# Patient Record
Sex: FEMALE | Race: BLACK OR AFRICAN AMERICAN | NOT HISPANIC OR LATINO | Employment: PART TIME | URBAN - METROPOLITAN AREA
[De-identification: names, ages, dates, MRNs, and addresses within clinical notes are randomized per-mention and may not be internally consistent; named-entity substitution may affect disease eponyms.]

---
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
## 2022-04-11 ENCOUNTER — HOSPITAL ENCOUNTER (EMERGENCY)
Facility: HOSPITAL | Age: 22
Discharge: HOME | End: 2022-04-12
Attending: EMERGENCY MEDICINE
Payer: MEDICARE

## 2022-04-11 DIAGNOSIS — K08.89 DENTALGIA: ICD-10-CM

## 2022-04-11 DIAGNOSIS — J45.901 ASTHMA EXACERBATION: Primary | ICD-10-CM

## 2022-04-11 PROCEDURE — 99283 EMERGENCY DEPT VISIT LOW MDM: CPT

## 2022-04-11 PROCEDURE — 94640 AIRWAY INHALATION TREATMENT: CPT

## 2022-04-11 PROCEDURE — 99284 EMERGENCY DEPT VISIT MOD MDM: CPT | Performed by: EMERGENCY MEDICINE

## 2022-04-11 RX ORDER — IPRATROPIUM BROMIDE AND ALBUTEROL SULFATE 2.5; .5 MG/3ML; MG/3ML
3 SOLUTION RESPIRATORY (INHALATION) ONCE
Status: COMPLETED | OUTPATIENT
Start: 2022-04-11 | End: 2022-04-11

## 2022-04-11 RX ORDER — ALBUTEROL SULFATE 90 UG/1
2 AEROSOL, METERED RESPIRATORY (INHALATION) ONCE
Status: COMPLETED | OUTPATIENT
Start: 2022-04-11 | End: 2022-04-11

## 2022-04-11 RX ORDER — IBUPROFEN 600 MG/1
600 TABLET ORAL EVERY 6 HOURS PRN
Qty: 20 TABLET | Refills: 0 | Status: SHIPPED | OUTPATIENT
Start: 2022-04-11

## 2022-04-11 RX ORDER — IBUPROFEN 600 MG/1
600 TABLET ORAL ONCE
Status: COMPLETED | OUTPATIENT
Start: 2022-04-11 | End: 2022-04-11

## 2022-04-11 RX ORDER — PREDNISONE 20 MG/1
60 TABLET ORAL ONCE
Status: COMPLETED | OUTPATIENT
Start: 2022-04-11 | End: 2022-04-11

## 2022-04-11 RX ORDER — ALBUTEROL SULFATE 2.5 MG/3ML
2.5 SOLUTION RESPIRATORY (INHALATION) EVERY 6 HOURS PRN
Qty: 75 ML | Refills: 0 | Status: SHIPPED | OUTPATIENT
Start: 2022-04-11

## 2022-04-11 RX ORDER — PREDNISONE 20 MG/1
40 TABLET ORAL DAILY
Qty: 8 TABLET | Refills: 0 | Status: SHIPPED | OUTPATIENT
Start: 2022-04-11 | End: 2022-04-15

## 2022-04-11 RX ADMIN — IPRATROPIUM BROMIDE AND ALBUTEROL SULFATE 3 ML: 2.5; .5 SOLUTION RESPIRATORY (INHALATION) at 23:24

## 2022-04-11 RX ADMIN — IBUPROFEN 600 MG: 600 TABLET ORAL at 23:22

## 2022-04-11 RX ADMIN — ALBUTEROL SULFATE 2 PUFF: 90 AEROSOL, METERED RESPIRATORY (INHALATION) at 23:22

## 2022-04-11 RX ADMIN — PREDNISONE 60 MG: 20 TABLET ORAL at 23:22

## 2022-04-11 NOTE — Clinical Note
Malloryarron Lemusumm was seen and treated in our emergency department on 4/11/2022  Diagnosis:     Silverio Wei  may return to work on return date  She may return on this date: 04/13/2022         If you have any questions or concerns, please don't hesitate to call        Sera Jackson RN    ______________________________           _______________          _______________  [facility] [place]                              Date                                Time

## 2022-04-12 VITALS
BODY MASS INDEX: 25.03 KG/M2 | DIASTOLIC BLOOD PRESSURE: 68 MMHG | WEIGHT: 159.83 LBS | TEMPERATURE: 98.4 F | OXYGEN SATURATION: 100 % | HEART RATE: 78 BPM | RESPIRATION RATE: 18 BRPM | SYSTOLIC BLOOD PRESSURE: 114 MMHG

## 2022-04-12 NOTE — ED NOTES
Discharge instructions reviewed with patient by provider  Patient verbalized understanding with no further questions at this time  Ambulated with steady gait at time of discharge       Simin Lopez RN  04/12/22 7063

## 2022-04-12 NOTE — ED PROVIDER NOTES
History  Chief Complaint   Patient presents with    Asthma     pt states having an asthma exacerbation with no rescue inhaler  pt tries neb at home with some relief     Dental Pain     pt complains of R lower dental pain  pt currently taking amoxicillin     22 y/o with cough, wheezing, sob on and off since Nov 2021, worse over the past few days  No fevers  Pt  Has a hx of asthma and ran out of her inhaler  She is using her nebulizer with some relief  She also complains of pain in a cracked tooth on her R lower side  It cracked awhile ago but hurts now  She is seeing an endodontist who prescribed her amoxicillin today  No n/v, no cp  Prior to Admission Medications   Prescriptions Last Dose Informant Patient Reported? Taking? albuterol (2 5 mg/3 mL) 0 083 % nebulizer solution   No No   Sig: Take 3 mL (2 5 mg total) by nebulization every 6 (six) hours as needed for wheezing or shortness of breath   albuterol (PROVENTIL HFA,VENTOLIN HFA) 90 mcg/act inhaler   No No   Sig: Inhale 2 puffs every 6 (six) hours as needed for wheezing or shortness of breath   benzonatate (TESSALON PERLES) 100 mg capsule  Self No No   Sig: Take 1 capsule (100 mg total) by mouth 3 (three) times a day   Patient not taking: Reported on 12/10/2021    dextromethorphan-guaifenesin (MUCINEX DM)  MG per 12 hr tablet  Self No No   Sig: Take 1 tablet by mouth every 12 (twelve) hours as needed for cough   fluticasone (FLOVENT HFA) 110 MCG/ACT inhaler  Self Yes No   Sig: Inhale 2 puffs 2 (two) times a day Rinse mouth after use  fluticasone-vilanterol (Breo Ellipta) 200-25 MCG/INH inhaler  Self No No   Sig: Inhale 1 puff daily Rinse mouth after use     Patient not taking: Reported on 12/10/2021    fluticasone-vilanterol (Breo Ellipta) 200-25 MCG/INH inhaler   No No   Sig: Inhale 1 puff daily Rinse mouth after use    montelukast (SINGULAIR) 10 mg tablet  Self Yes No   Sig: Take 10 mg by mouth daily at bedtime   predniSONE 10 mg tablet  Self No No   Sig: Take 40 mg x 3 days, then 30 mg x 3 days, then 20 mg x 3 days, then 10 mg x 3 days   Patient not taking: Reported on 12/10/2021       Facility-Administered Medications: None       Past Medical History:   Diagnosis Date    Anxiety     Asthma        No past surgical history on file  No family history on file  I have reviewed and agree with the history as documented  E-Cigarette/Vaping    E-Cigarette Use Never User      E-Cigarette/Vaping Substances    Nicotine No     THC No     CBD No     Flavoring No     Other No     Unknown No      Social History     Tobacco Use    Smoking status: Never Smoker    Smokeless tobacco: Never Used   Vaping Use    Vaping Use: Never used   Substance Use Topics    Alcohol use: No    Drug use: No       Review of Systems   Constitutional: Negative for appetite change, fatigue and fever  HENT: Positive for dental problem  Negative for rhinorrhea and sore throat  Eyes: Negative for pain  Respiratory: Positive for cough, shortness of breath and wheezing  Cardiovascular: Negative for chest pain and leg swelling  Gastrointestinal: Negative for abdominal pain, diarrhea and vomiting  Genitourinary: Negative for dysuria and flank pain  Musculoskeletal: Negative for back pain and neck pain  Skin: Negative for rash  Neurological: Negative for syncope and headaches  Psychiatric/Behavioral:        Mood normal       Physical Exam  Physical Exam  Vitals and nursing note reviewed  Constitutional:       Appearance: She is well-developed  HENT:      Head: Normocephalic and atraumatic  Right Ear: External ear normal       Left Ear: External ear normal    Eyes:      General: No scleral icterus  Extraocular Movements: Extraocular movements intact  Cardiovascular:      Rate and Rhythm: Normal rate and regular rhythm  Pulmonary:      Effort: Pulmonary effort is normal  No respiratory distress  Comments: +exp   Wheezing diffusely  Abdominal:      Palpations: Abdomen is soft  Tenderness: There is no abdominal tenderness  Musculoskeletal:         General: No deformity or signs of injury  Normal range of motion  Cervical back: Normal range of motion and neck supple  Skin:     General: Skin is warm and dry  Coloration: Skin is not jaundiced or pale  Neurological:      General: No focal deficit present  Mental Status: She is alert and oriented to person, place, and time     Psychiatric:         Mood and Affect: Mood normal          Behavior: Behavior normal          Vital Signs  ED Triage Vitals   Temperature Pulse Respirations Blood Pressure SpO2   04/11/22 2300 04/11/22 2258 04/11/22 2258 04/11/22 2258 04/11/22 2258   98 4 °F (36 9 °C) 95 20 (!) 145/103 98 %      Temp Source Heart Rate Source Patient Position - Orthostatic VS BP Location FiO2 (%)   04/11/22 2300 04/11/22 2258 04/11/22 2258 04/11/22 2258 --   Oral Monitor Sitting Right arm       Pain Score       --                  Vitals:    04/11/22 2258 04/11/22 2315   BP: (!) 145/103 114/68   Pulse: 95 92   Patient Position - Orthostatic VS: Sitting          Visual Acuity      ED Medications  Medications   ipratropium-albuterol (DUO-NEB) 0 5-2 5 mg/3 mL inhalation solution 3 mL (has no administration in time range)   predniSONE tablet 60 mg (has no administration in time range)   ibuprofen (MOTRIN) tablet 600 mg (has no administration in time range)   albuterol (PROVENTIL HFA,VENTOLIN HFA) inhaler 2 puff (2 puffs Inhalation Given 4/11/22 2322)       Diagnostic Studies  Results Reviewed     None                 No orders to display              Procedures  Procedures         ED Course                                             MDM  Number of Diagnoses or Management Options      Disposition  Final diagnoses:   Asthma exacerbation   Dentalgia     Time reflects when diagnosis was documented in both MDM as applicable and the Disposition within this note Time User Action Codes Description Comment    4/11/2022 11:13 PM Bonnie Meng Add [U25 108] Asthma exacerbation     4/11/2022 11:13 PM Hunter Ortega Add [K08 89] Raul Perezs       ED Disposition     ED Disposition Condition Date/Time Comment    Discharge Stable Mon Apr 11, 2022 11:13 PM [address] discharge to home/self care  Follow-up Information     Follow up With Specialties Details Why Contact Info    Sloan Posada DO Family Medicine   [address] [place] [address] [address] [id number] [phone]      Duran Joe, ROSITA Oral Maxillofacial Surgery   [address] [facility] [facility]  [phone]            Patient's Medications   Discharge Prescriptions    ALBUTEROL (2 5 MG/3 ML) 0 083 % NEBULIZER SOLUTION    Take 3 mL (2 5 mg total) by nebulization every 6 (six) hours as needed for wheezing or shortness of breath       Start Date: 4/11/2022 End Date: --       Order Dose: 2 5 mg       Quantity: 75 mL    Refills: 0    IBUPROFEN (MOTRIN) 600 MG TABLET    Take 1 tablet (600 mg total) by mouth every 6 (six) hours as needed for moderate pain       Start Date: 4/11/2022 End Date: --       Order Dose: 600 mg       Quantity: 20 tablet    Refills: 0    PREDNISONE 20 MG TABLET    Take 2 tablets (40 mg total) by mouth daily for 4 days       Start Date: 4/11/2022 End Date: 4/15/2022       Order Dose: 40 mg       Quantity: 8 tablet    Refills: 0       No discharge procedures on file      PDMP Review     None          ED Provider  Electronically Signed by           Jeanna Anderson MD  04/11/22 8944

## 2022-09-08 ENCOUNTER — HOSPITAL ENCOUNTER (EMERGENCY)
Facility: HOSPITAL | Age: 22
Discharge: HOME | End: 2022-09-09
Attending: EMERGENCY MEDICINE

## 2022-09-08 DIAGNOSIS — F41.0 PANIC ATTACK: ICD-10-CM

## 2022-09-08 DIAGNOSIS — F41.9 ANXIETY: Primary | ICD-10-CM

## 2022-09-08 PROCEDURE — 99285 EMERGENCY DEPT VISIT HI MDM: CPT

## 2022-09-08 PROCEDURE — 96372 THER/PROPH/DIAG INJ SC/IM: CPT

## 2022-09-08 PROCEDURE — 99284 EMERGENCY DEPT VISIT MOD MDM: CPT | Performed by: PHYSICIAN ASSISTANT

## 2022-09-08 RX ORDER — LORAZEPAM 2 MG/ML
2 INJECTION INTRAMUSCULAR ONCE
Status: COMPLETED | OUTPATIENT
Start: 2022-09-08 | End: 2022-09-08

## 2022-09-08 RX ORDER — HALOPERIDOL 5 MG/ML
5 INJECTION INTRAMUSCULAR ONCE
Status: COMPLETED | OUTPATIENT
Start: 2022-09-08 | End: 2022-09-08

## 2022-09-08 RX ADMIN — LORAZEPAM 2 MG: 2 INJECTION INTRAMUSCULAR; INTRAVENOUS at 23:07

## 2022-09-08 RX ADMIN — HALOPERIDOL LACTATE 5 MG: 5 INJECTION, SOLUTION INTRAMUSCULAR at 23:04

## 2022-09-08 NOTE — Clinical Note
Perry Diaz was seen and treated in our emergency department on 9/8/2022     ?    ?    ? Diagnosis: ?    Val Redmond  may return to work on return date  She may return on this date: 09/12/2022    ? If you have any questions or concerns, please don't hesitate to call        Barney Galvan PA-C    ______________________________           _______________          _______________  Hospital Representative                              Date                                Time

## 2022-09-08 NOTE — Clinical Note
Brie Pappas accompanied Michelle Sender to the emergency department on 9/8/2022  Return date if applicable: [id number]        If you have any questions or concerns, please don't hesitate to call        Theresa Bhatt PA-C

## 2022-09-08 NOTE — Clinical Note
Sonal Henley was seen and treated in our emergency department on 9/8/2022     ?    ?    ? Diagnosis: ?    Brian Ramirez  may return to work on return date  She may return on this date: 09/12/2022    ? If you have any questions or concerns, please don't hesitate to call        Hans Harvey PA-C    ______________________________           _______________          _______________  Hospital Representative                              Date                                Time

## 2022-09-09 VITALS
SYSTOLIC BLOOD PRESSURE: 118 MMHG | DIASTOLIC BLOOD PRESSURE: 77 MMHG | OXYGEN SATURATION: 99 % | HEART RATE: 70 BPM | RESPIRATION RATE: 20 BRPM | TEMPERATURE: 98.2 F

## 2022-09-09 NOTE — ED NOTES
Pt came back from waiting room unwilling to answer questions  Pt moved to bed and attempting to scratch herself  Per boyfriend and father, pt has a history of self harm by scratching  Provider at bedside to evaluate  Per provider,pt placed in locked restraints for safety at this time        Isela Adrian RN  09/08/22 1319

## 2022-09-09 NOTE — DISCHARGE INSTRUCTIONS
Please return to the ED for any concerns as outlined in the AVS or for any other concerns  Please follow-up with your primary care provider in 2 days for re-evaluation and further management

## 2022-09-09 NOTE — ED PROVIDER NOTES
History  Chief Complaint   Patient presents with    Panic Attack     Panic attack 1 hour reported by boyfriend  Patient is uncooperative with questioning  Elijah Loera is a 22 yo F presenting with "panic attack" and bizarre behavior for the past one hour  Per boyfriend, she was at her job at [employer] when symptoms began  He picked her up and reportedly tried to calm her down but she became more and more agitated, tearful, and anxious  By time of arrival she is scratching all over her body, tearful, and swinging arms and legs sporadically  Per father, she has had numerous similar episodes previously although it has been several years since an episode of this severity  These reportedly began around the age of 17  Father notes he is often able to talk her down and calm her during these episodes before they become this severe  Per father these episodes have resolved with restraints/medications within a few hours  No reported concerns for drug abuse  No reported psychiatric medications at home  History provided by:  Relative and significant other   used: No        Prior to Admission Medications   Prescriptions Last Dose Informant Patient Reported? Taking?    albuterol (2 5 mg/3 mL) 0 083 % nebulizer solution   No No   Sig: Take 3 mL (2 5 mg total) by nebulization every 6 (six) hours as needed for wheezing or shortness of breath   albuterol (2 5 mg/3 mL) 0 083 % nebulizer solution   No No   Sig: Take 3 mL (2 5 mg total) by nebulization every 6 (six) hours as needed for wheezing or shortness of breath   albuterol (PROVENTIL HFA,VENTOLIN HFA) 90 mcg/act inhaler   No No   Sig: Inhale 2 puffs every 6 (six) hours as needed for wheezing or shortness of breath   benzonatate (TESSALON PERLES) 100 mg capsule  Self No No   Sig: Take 1 capsule (100 mg total) by mouth 3 (three) times a day   Patient not taking: Reported on 12/10/2021    dextromethorphan-guaifenesin (MUCINEX DM)  MG per 12 hr tablet  Self No No   Sig: Take 1 tablet by mouth every 12 (twelve) hours as needed for cough   fluticasone (FLOVENT HFA) 110 MCG/ACT inhaler  Self Yes No   Sig: Inhale 2 puffs 2 (two) times a day Rinse mouth after use  fluticasone-vilanterol (Breo Ellipta) 200-25 MCG/INH inhaler  Self No No   Sig: Inhale 1 puff daily Rinse mouth after use  Patient not taking: Reported on 12/10/2021    fluticasone-vilanterol (Breo Ellipta) 200-25 MCG/INH inhaler   No No   Sig: Inhale 1 puff daily Rinse mouth after use  ibuprofen (MOTRIN) 600 mg tablet   No No   Sig: Take 1 tablet (600 mg total) by mouth every 6 (six) hours as needed for moderate pain   montelukast (SINGULAIR) 10 mg tablet  Self Yes No   Sig: Take 10 mg by mouth daily at bedtime   predniSONE 10 mg tablet  Self No No   Sig: Take 40 mg x 3 days, then 30 mg x 3 days, then 20 mg x 3 days, then 10 mg x 3 days   Patient not taking: Reported on 12/10/2021       Facility-Administered Medications: None       Past Medical History:   Diagnosis Date    Anxiety     Asthma        History reviewed  No pertinent surgical history  History reviewed  No pertinent family history  I have reviewed and agree with the history as documented  E-Cigarette/Vaping    E-Cigarette Use Never User      E-Cigarette/Vaping Substances    Nicotine No     THC No     CBD No     Flavoring No     Other No     Unknown No      Social History     Tobacco Use    Smoking status: Never Smoker    Smokeless tobacco: Never Used   Vaping Use    Vaping Use: Never used   Substance Use Topics    Alcohol use: No    Drug use: No       Review of Systems   Unable to perform ROS: Mental status change   Constitutional: Negative for fever  HENT: Negative for congestion and rhinorrhea  Respiratory: Negative for cough, shortness of breath and wheezing  Cardiovascular: Negative for chest pain and palpitations  Gastrointestinal: Negative for vomiting  Skin: Negative for rash and wound  Psychiatric/Behavioral: The patient is nervous/anxious and is hyperactive  Physical Exam  Physical Exam  Constitutional:       Appearance: She is well-developed  Comments: Tearful, crying  Hyperventilating  Sporadically thrashing arms/legs  HENT:      Head: Normocephalic and atraumatic  Right Ear: External ear normal       Left Ear: External ear normal    Eyes:      Conjunctiva/sclera: Conjunctivae normal       Pupils: Pupils are equal, round, and reactive to light  Cardiovascular:      Rate and Rhythm: Regular rhythm  Tachycardia present  Heart sounds: Normal heart sounds  No murmur heard  No friction rub  No gallop  Pulmonary:      Effort: Pulmonary effort is normal  No respiratory distress  Breath sounds: Normal breath sounds  No wheezing  Abdominal:      General: There is no distension  Palpations: Abdomen is soft  Tenderness: There is no guarding  Musculoskeletal:      Cervical back: Normal range of motion and neck supple  Skin:     General: Skin is warm and dry  Capillary Refill: Capillary refill takes less than 2 seconds  Findings: No erythema or rash  Neurological:      Motor: No abnormal muscle tone  Comments: Alerts to verbal stimuli but answering questions inappropriately  Repeats "I'm so tired" and "I'm itchy all over"  Sporadically thrashing extremities, behaving bizarrely  Psychiatric:         Mood and Affect: Mood is anxious  Affect is tearful  Behavior: Behavior is hyperactive           Vital Signs  ED Triage Vitals [09/08/22 2241]   Temperature Pulse Respirations Blood Pressure SpO2   98 2 °F (36 8 °C) (!) 107 20 129/72 97 %      Temp Source Heart Rate Source Patient Position - Orthostatic VS BP Location FiO2 (%)   Temporal Monitor Sitting Left arm --      Pain Score       --           Vitals:    09/08/22 2241   BP: 129/72   Pulse: (!) 107   Patient Position - Orthostatic VS: Sitting         Visual Acuity      ED Medications  Medications   LORazepam (ATIVAN) injection 2 mg (2 mg Intramuscular Given 9/8/22 2307)   haloperidol lactate (HALDOL) injection 5 mg (5 mg Intramuscular Given 9/8/22 2304)       Diagnostic Studies  Results Reviewed     None                 No orders to display              Procedures  Procedures         ED Course  ED Course as of 09/09/22 0054   u Sep 08, 2022   2255 Pt presenting with bizarre behavior, anxiety, scratching all over body  She repeats "everything itches" and "I'm so tired"  Periodically thrashing and swinging arms and legs  Pt appears to pose immediate threat to self/others  Placed into 4 point restraints at this time due to this threat  Will provide haldol/ativan   2350 Some improvement noted by father - she is more responsive to questions at this time  Does still appear to have anxiety, ongoing behavioral abnormality and extremity flailing  Will keep in 4 point restraints until able to tolerate 2 point  Fri Sep 09, 2022   0034 Pt reduced to 2 point restraints at this time, tolerating well  0046 Pt signed out to [employer]  Plan for continued observation, restraint removal as tolerated, disposition pending improvement  MDM      Disposition  Final diagnoses:   None     ED Disposition     None      Follow-up Information    None         Patient's Medications   Discharge Prescriptions    No medications on file       No discharge procedures on file      PDMP Review     None          ED Provider  Electronically Signed by           Nicholas Thomas PA-C  09/09/22 2089

## 2022-09-09 NOTE — ED NOTES
PT was woken up and cooperative  Restraints removed at this time  PT still cooperative        Shelley Lyles  09/09/22 0340

## 2022-09-09 NOTE — ED CARE HANDOFF
Emergency Department Sign Out Note        Sign out and transfer of care from Domingo Romano PA-C  See Separate Emergency Department note  The patient, Constance Acevedo, was evaluated by the previous provider for reported panic attack and bizarre behavior  Workup Completed:   N/A    ED Course / Workup Pending (followup):        Per previous provider patient presents to the ED with her father and boyfriend for concern for panic attack and bizarre behavior  Reports that the patient has had multiple similar episodes previously  Denies any drug use or previous psychiatric history  Patient was put in 4 point restraints for her safety as she was scratching at her skin and given Ativan and Haldol  Now decreased to 2 point restraints  Plan will be to re-evaluate patient in the few hours with likely discharge if patient returns to baseline  If significant signs or symptoms of a panic attack or bizarre persist will consult crisis for re-evaluation  Patient reports she feels safe going home  Family reports they feel ok taking the patient home  Strict return precautions verbally communicated to the patient as outlined in the AVS   All patient questions and concerns were answered  Patient verbally communicated their understanding and agreement to the above plan  Patient stable at discharge  Will be getting a ride home from her father and boyfriend  ED Course as of 09/09/22 0531   Fri Sep 09, 2022   0401 On bedside re-evaluation patient continues to sleep in the stretcher in no acute distress  Patient is still significantly tired likely from the medications however is easily arousable  Will allow patient to continue to sleep and re-evaluate  Patient is currently out of restraints  0524 Pt continues to sleep in the stretcher in NAD  Easily arousable  States she is feeling much better  Denies any physical complaints at this time  Denies SI, HI, Ah and VH  Denies having a PCP   Will provide information for assistance in finding a PCP  Advised to f/u ASAP for re-evaluation and further management  Procedures  MDM        Disposition  Final diagnoses:   Anxiety   Panic attack     Time reflects when diagnosis was documented in both MDM as applicable and the Disposition within this note     Time User Action Codes Description Comment    9/9/2022  4:05 AM Ova Kenn Add [F41 9] Anxiety     9/9/2022  4:05 AM Ova DuPage Add [F41 0] Panic attack       ED Disposition     ED Disposition   Discharge    Condition   Stable    Date/Time   Fri Sep 9, 2022  5:28 AM    Comment   [address] [facility] discharge to home/self care  Follow-up Information     Follow up With Specialties Details Why Contact Info Additional [address] [facility] Emergency Department Emergency Medicine Go to  As needed, If symptoms worsen [facility] [id number]  [address] [facility] Emergency Department, [address] [place] [address] 200  Call  For a primary care provider, For further evaluation [phone]           Patient's Medications   Discharge Prescriptions    No medications on file     No discharge procedures on file         ED Provider  Electronically Signed by     Rudy Roberts PA-C  09/09/22 [address], PA-C  09/09/22 0900

## 2022-10-12 PROBLEM — J20.5 RSV BRONCHITIS: Status: RESOLVED | Noted: 2021-11-19 | Resolved: 2022-10-12

## 2022-12-12 ENCOUNTER — HOSPITAL ENCOUNTER (EMERGENCY)
Facility: HOSPITAL | Age: 22
Discharge: HOME | End: 2022-12-13
Attending: EMERGENCY MEDICINE

## 2022-12-12 ENCOUNTER — APPOINTMENT (EMERGENCY)
Dept: ULTRASOUND IMAGING | Facility: HOSPITAL | Age: 22
End: 2022-12-12

## 2022-12-12 VITALS
TEMPERATURE: 98.1 F | RESPIRATION RATE: 18 BRPM | SYSTOLIC BLOOD PRESSURE: 122 MMHG | WEIGHT: 161.38 LBS | OXYGEN SATURATION: 100 % | HEIGHT: 67 IN | HEART RATE: 71 BPM | BODY MASS INDEX: 25.33 KG/M2 | DIASTOLIC BLOOD PRESSURE: 57 MMHG

## 2022-12-12 DIAGNOSIS — Z34.90 INTRAUTERINE PREGNANCY: Primary | ICD-10-CM

## 2022-12-12 DIAGNOSIS — O26.899 ABDOMINAL PAIN IN PREGNANCY: ICD-10-CM

## 2022-12-12 DIAGNOSIS — R10.9 ABDOMINAL PAIN IN PREGNANCY: ICD-10-CM

## 2022-12-12 DIAGNOSIS — O21.9 NAUSEA AND VOMITING IN PREGNANCY: ICD-10-CM

## 2022-12-12 LAB
B-HCG SERPL-ACNC: ABNORMAL MIU/ML (ref 0–11.6)
EXT PREGNANCY TEST URINE: POSITIVE
EXT. CONTROL: ABNORMAL

## 2022-12-12 RX ORDER — METOCLOPRAMIDE 10 MG/1
10 TABLET ORAL ONCE
Status: COMPLETED | OUTPATIENT
Start: 2022-12-12 | End: 2022-12-12

## 2022-12-12 RX ORDER — PYRIDOXINE HCL (VITAMIN B6) 25 MG
25 TABLET ORAL 3 TIMES DAILY PRN
Qty: 30 TABLET | Refills: 0 | Status: SHIPPED | OUTPATIENT
Start: 2022-12-12

## 2022-12-12 RX ORDER — ACETAMINOPHEN 325 MG/1
975 TABLET ORAL ONCE
Status: COMPLETED | OUTPATIENT
Start: 2022-12-12 | End: 2022-12-12

## 2022-12-12 RX ADMIN — METOCLOPRAMIDE 10 MG: 10 TABLET ORAL at 21:00

## 2022-12-12 RX ADMIN — ACETAMINOPHEN 975 MG: 325 TABLET, FILM COATED ORAL at 21:00

## 2022-12-12 NOTE — Clinical Note
Lebron Wright was seen and treated in our emergency department on 12/12/2022  No restrictions    ? ? Diagnosis: ?    Geovanna Tucker  may return to work on return date  She may return on this date: 12/14/2022    ? If you have any questions or concerns, please don't hesitate to call        Chiquita Reyes PA-C    ______________________________           _______________          _______________  Hospital Representative                              Date                                Time

## 2022-12-13 NOTE — ED PROVIDER NOTES
History  Chief Complaint   Patient presents with   • Abdominal Pain     Generalized abd pain x3 days  +nausea  No medication pta  Had at home positive pregnancy test  Denies vaginal bleeding or discharge     This is a 46-year-old female approximately 6 weeks pregnant presenting for evaluation of generalized abdominal pain x3 days  Abdominal pain is generalized and described as cramping  Patient states that she also has some nausea associated but no vomiting  She did not take any medications for pain prior to arrival   She states that she had a positive pregnancy test in November, LMP was October 30  She denies any vaginal bleeding or discharge at this time  She denies any diarrhea or constipation  History provided by:  Patient   used: No    Abdominal Pain  Pain location:  Generalized  Pain quality: aching and cramping    Pain radiates to:  Does not radiate  Pain severity:  Mild  Onset quality:  Gradual  Duration:  3 days  Timing:  Constant  Progression:  Waxing and waning  Chronicity:  New  Relieved by:  None tried  Worsened by:  Nothing  Ineffective treatments:  None tried  Associated symptoms: nausea    Associated symptoms: no chest pain, no chills, no constipation, no cough, no diarrhea, no dysuria, no fever, no hematuria, no shortness of breath, no sore throat, no vaginal bleeding, no vaginal discharge and no vomiting    Risk factors: pregnancy        Prior to Admission Medications   Prescriptions Last Dose Informant Patient Reported? Taking?    albuterol (2 5 mg/3 mL) 0 083 % nebulizer solution   No No   Sig: Take 3 mL (2 5 mg total) by nebulization every 6 (six) hours as needed for wheezing or shortness of breath   albuterol (2 5 mg/3 mL) 0 083 % nebulizer solution   No No   Sig: Take 3 mL (2 5 mg total) by nebulization every 6 (six) hours as needed for wheezing or shortness of breath   albuterol (PROVENTIL HFA,VENTOLIN HFA) 90 mcg/act inhaler   No No   Sig: Inhale 2 puffs every 6 (six) hours as needed for wheezing or shortness of breath   benzonatate (TESSALON PERLES) 100 mg capsule   No No   Sig: Take 1 capsule (100 mg total) by mouth 3 (three) times a day   Patient not taking: Reported on 12/10/2021    dextromethorphan-guaifenesin (MUCINEX DM)  MG per 12 hr tablet   No No   Sig: Take 1 tablet by mouth every 12 (twelve) hours as needed for cough   fluticasone (FLOVENT HFA) 110 MCG/ACT inhaler   Yes No   Sig: Inhale 2 puffs 2 (two) times a day Rinse mouth after use  fluticasone-vilanterol (Breo Ellipta) 200-25 MCG/INH inhaler   No No   Sig: Inhale 1 puff daily Rinse mouth after use  Patient not taking: Reported on 12/10/2021    fluticasone-vilanterol (Breo Ellipta) 200-25 MCG/INH inhaler   No No   Sig: Inhale 1 puff daily Rinse mouth after use  ibuprofen (MOTRIN) 600 mg tablet   No No   Sig: Take 1 tablet (600 mg total) by mouth every 6 (six) hours as needed for moderate pain   montelukast (SINGULAIR) 10 mg tablet   Yes No   Sig: Take 10 mg by mouth daily at bedtime   predniSONE 10 mg tablet   No No   Sig: Take 40 mg x 3 days, then 30 mg x 3 days, then 20 mg x 3 days, then 10 mg x 3 days   Patient not taking: Reported on 12/10/2021       Facility-Administered Medications: None       Past Medical History:   Diagnosis Date   • Anxiety    • Asthma        History reviewed  No pertinent surgical history  History reviewed  No pertinent family history  I have reviewed and agree with the history as documented  E-Cigarette/Vaping   • E-Cigarette Use Never User      E-Cigarette/Vaping Substances   • Nicotine No    • THC No    • CBD No    • Flavoring No    • Other No    • Unknown No      Social History     Tobacco Use   • Smoking status: Never   • Smokeless tobacco: Never   Vaping Use   • Vaping Use: Never used   Substance Use Topics   • Alcohol use: No   • Drug use: No       Review of Systems   Constitutional: Negative for chills and fever     HENT: Negative for congestion, ear pain, rhinorrhea, sinus pain, sore throat and trouble swallowing  Eyes: Negative for redness and visual disturbance  Respiratory: Negative for cough and shortness of breath  Cardiovascular: Negative for chest pain, palpitations and leg swelling  Gastrointestinal: Positive for abdominal pain and nausea  Negative for constipation, diarrhea and vomiting  Genitourinary: Negative for dysuria, frequency, hematuria, urgency, vaginal bleeding and vaginal discharge  Musculoskeletal: Negative for back pain, myalgias and neck pain  Skin: Negative for color change and rash  Neurological: Negative for dizziness, weakness, light-headedness, numbness and headaches  Psychiatric/Behavioral: The patient is not nervous/anxious  All other systems reviewed and are negative  Physical Exam  Physical Exam  Vitals reviewed  Constitutional:       General: She is not in acute distress  Appearance: Normal appearance  She is well-developed and well-groomed  She is not ill-appearing, toxic-appearing or diaphoretic  HENT:      Head: Normocephalic and atraumatic  Right Ear: External ear normal       Left Ear: External ear normal       Nose: Nose normal  No congestion or rhinorrhea  Mouth/Throat:      Mouth: Mucous membranes are moist       Pharynx: Oropharynx is clear  No oropharyngeal exudate or posterior oropharyngeal erythema  Eyes:      General: No scleral icterus  Right eye: No discharge  Left eye: No discharge  Extraocular Movements: Extraocular movements intact  Conjunctiva/sclera: Conjunctivae normal    Cardiovascular:      Rate and Rhythm: Normal rate and regular rhythm  Pulses: Normal pulses  Heart sounds: No murmur heard  No friction rub  No gallop  Pulmonary:      Effort: Pulmonary effort is normal  No respiratory distress  Breath sounds: Normal breath sounds  No wheezing, rhonchi or rales  Abdominal:      General: Abdomen is flat   There is no distension  Palpations: Abdomen is soft  Tenderness: There is no abdominal tenderness  There is no right CVA tenderness, left CVA tenderness, guarding or rebound  Musculoskeletal:         General: No deformity  Normal range of motion  Cervical back: Normal range of motion and neck supple  Skin:     General: Skin is warm and dry  Coloration: Skin is not jaundiced or pale  Findings: No rash  Neurological:      General: No focal deficit present  Mental Status: She is alert  Psychiatric:         Mood and Affect: Mood normal          Behavior: Behavior normal  Behavior is cooperative           Vital Signs  ED Triage Vitals [12/12/22 1853]   Temperature Pulse Respirations Blood Pressure SpO2   98 1 °F (36 7 °C) 71 18 122/57 100 %      Temp Source Heart Rate Source Patient Position - Orthostatic VS BP Location FiO2 (%)   Oral Monitor Sitting Right arm --      Pain Score       --           Vitals:    12/12/22 1853   BP: 122/57   Pulse: 71   Patient Position - Orthostatic VS: Sitting         Visual Acuity      ED Medications  Medications   metoclopramide (REGLAN) tablet 10 mg (10 mg Oral Given 12/12/22 2100)   acetaminophen (TYLENOL) tablet 975 mg (975 mg Oral Given 12/12/22 2100)       Diagnostic Studies  Results Reviewed     Procedure Component Value Units Date/Time    hCG, quantitative [[id number]]  (Abnormal) Collected: 12/12/22 2127    Lab Status: Final result Specimen: Blood from Arm, Left Updated: 12/12/22 2221     HCG, Quant 50,119 mIU/mL     Narrative:       Expected Ranges:     Approximate               Approximate HCG  Gestation age          Concentration ( mIU/mL)  _____________          ______________________   [facility]                      HCG values  0 2-1                       5-50  1-2                           2-3                         100-5000  3-4                         500-51114  4-5                         1000-42645  5-6 [id number]  6-8                         [id number]  8-12                        [id number]      POCT pregnancy, urine [[id number]]  (Abnormal) Resulted: 12/12/22 2100    Lab Status: Final result Updated: 12/12/22 2100     EXT Preg Test, Ur Positive     Control Valid                 US OB < 14 weeks with transvaginal   Final Result by Sia Valentino MD (12/12 2324)      Single live intrauterine gestation at 6 weeks 1 day  ANGELITA of 12/12/2022  Workstation performed: [id number]                    Procedures  Procedures         ED Course  ED Course as of 12/13/22 0257   Mon Dec 12, 2022   2110 PREGNANCY TEST URINE(!): Positive  LMP 10/30/22  Called OBGYN already  Will US w/ abdominal pain  2224 HCG QUANTITATIVE(!): 50,119   2343 US OB < 14 weeks with transvaginal  Single live intrauterine gestation at 6 weeks 1 day  MDM  Number of Diagnoses or Management Options  Abdominal pain in pregnancy: new and requires workup  Intrauterine pregnancy: new and requires workup  Nausea and vomiting in pregnancy: new and requires workup  Diagnosis management comments:     Patient presenting for evaluation of generalized abdominal pain and nausea in the setting of positive pregnancy test at home  LMP was 10/30/2022, patient already discussed with her OB/GYN  Will order U pregnant and beta quant for evaluation of pregnancy  Will give patient Tylenol and Reglan for symptoms and evaluate for improvement  U pregnant positive, beta quant of 50K, in the setting of generalized abdominal pain and pregnancy, will order transvaginal ultrasound to rule out ectopic pregnancy  Patient feels symptoms improved at this time  Ultrasound with a single live IUP at 6 weeks and 1 day  Reviewed findings with patient  Patient feels improved and comfortable with discharge at this time  Patient encouraged to follow-up with OB/GYN  Patient given B6 and Unisom at time of discharge for nausea    Strict return precautions discussed with patient bedside  Prior to discharge, discharge instructions were discussed with patient at bedside  Patient was provided both verbal and written instructions  Patient is understanding of the discharge instructions and is agreeable to plan of care  Return precautions were discussed with patient bedside, patient verbalized understanding of signs and symptoms that would necessitate return to the ED  All questions were answered  Patient was comfortable with the plan of care and discharged to home  Dispo: discharge home with follow up to OBGYN  Patient stable, in no acute distress and non-toxic at discharge  Amount and/or Complexity of Data Reviewed  Clinical lab tests: ordered and reviewed  Tests in the radiology section of CPT®: ordered and reviewed  Tests in the medicine section of CPT®: ordered and reviewed  Review and summarize past medical records: yes  Independent visualization of images, tracings, or specimens: yes    Risk of Complications, Morbidity, and/or Mortality  Presenting problems: moderate  Management options: low    Patient Progress  Patient progress: improved      Disposition  Final diagnoses:   Intrauterine pregnancy   Abdominal pain in pregnancy   Nausea and vomiting in pregnancy     Time reflects when diagnosis was documented in both MDM as applicable and the Disposition within this note     Time User Action Codes Description Comment    12/12/2022 11:43 PM Dora Quick Add [P24 45] Intrauterine pregnancy     12/12/2022 11:44 PM Dora Quick Add [O26 899,  R10 9] Abdominal pain in pregnancy     12/12/2022 11:46 PM Dora Quick Add [O21 9] Nausea and vomiting in pregnancy       ED Disposition     ED Disposition   Discharge    Condition   Stable    Date/Time   Mon Dec 12, 2022 11:43 PM    Comment   [address] discharge to home/self care                 Follow-up Information     Follow up With Specialties Details [facility]   [address] [place] [address]  [phone]            Discharge Medication List as of 12/12/2022 11:47 PM      START taking these medications    Details   doxylamine (UNISOM) 25 MG tablet Take 0 5 tablets (12 5 mg total) by mouth daily at bedtime as needed for sleep or nausea, Starting Mon 12/12/2022, Normal      pyridoxine (B-6) 25 MG tablet Take 1 tablet (25 mg total) by mouth 3 (three) times a day as needed (nausea), Starting Mon 12/12/2022, Normal         CONTINUE these medications which have NOT CHANGED    Details   !! albuterol (2 5 mg/3 mL) 0 083 % nebulizer solution Take 3 mL (2 5 mg total) by nebulization every 6 (six) hours as needed for wheezing or shortness of breath, Starting Fri 12/10/2021, Normal      !! albuterol (2 5 mg/3 mL) 0 083 % nebulizer solution Take 3 mL (2 5 mg total) by nebulization every 6 (six) hours as needed for wheezing or shortness of breath, Starting Mon 4/11/2022, Normal      albuterol (PROVENTIL HFA,VENTOLIN HFA) 90 mcg/act inhaler Inhale 2 puffs every 6 (six) hours as needed for wheezing or shortness of breath, Starting Fri 12/10/2021, Normal      benzonatate (TESSALON PERLES) 100 mg capsule Take 1 capsule (100 mg total) by mouth 3 (three) times a day, Starting Mon 11/22/2021, Normal      dextromethorphan-guaifenesin (MUCINEX DM)  MG per 12 hr tablet Take 1 tablet by mouth every 12 (twelve) hours as needed for cough, Starting Mon 11/22/2021, Normal      fluticasone (FLOVENT HFA) 110 MCG/ACT inhaler Inhale 2 puffs 2 (two) times a day Rinse mouth after use , Historical Med      fluticasone-vilanterol (Breo Ellipta) 200-25 MCG/INH inhaler Inhale 1 puff daily Rinse mouth after use , Starting Mon 11/22/2021, Until Wed 12/22/2021, Normal      fluticasone-vilanterol (Breo Ellipta) 200-25 MCG/INH inhaler Inhale 1 puff daily Rinse mouth after use , Starting Fri 12/10/2021, Until Sun 1/9/2022, Normal      ibuprofen (MOTRIN) 600 mg tablet Take 1 tablet (600 mg total) by mouth every 6 (six) hours as needed for moderate pain, Starting Mon 4/11/2022, Normal      montelukast (SINGULAIR) 10 mg tablet Take 10 mg by mouth daily at bedtime, Historical Med      predniSONE 10 mg tablet Take 40 mg x 3 days, then 30 mg x 3 days, then 20 mg x 3 days, then 10 mg x 3 days, Normal       !! - Potential duplicate medications found  Please discuss with provider  No discharge procedures on file      PDMP Review     None          ED Provider  Electronically Signed by [facility] Hospital[facility]  12/13/22 8515

## 2022-12-13 NOTE — DISCHARGE INSTRUCTIONS
Please refer to the attached information for strict return instructions  If symptoms worsen or new symptoms develop please return to the ER  Please follow-up with your OBGYN for re-evaluation of symptoms

## 2022-12-15 ENCOUNTER — HOSPITAL ENCOUNTER (EMERGENCY)
Facility: HOSPITAL | Age: 22
Discharge: HOME | End: 2022-12-15
Attending: EMERGENCY MEDICINE

## 2022-12-15 ENCOUNTER — APPOINTMENT (EMERGENCY)
Dept: ULTRASOUND IMAGING | Facility: HOSPITAL | Age: 22
End: 2022-12-15

## 2022-12-15 VITALS
TEMPERATURE: 97.8 F | HEART RATE: 99 BPM | DIASTOLIC BLOOD PRESSURE: 70 MMHG | SYSTOLIC BLOOD PRESSURE: 119 MMHG | BODY MASS INDEX: 24.95 KG/M2 | RESPIRATION RATE: 16 BRPM | OXYGEN SATURATION: 100 % | HEIGHT: 67 IN | WEIGHT: 158.95 LBS

## 2022-12-15 DIAGNOSIS — J06.9 VIRAL URI WITH COUGH: Primary | ICD-10-CM

## 2022-12-15 LAB
FLUAV RNA RESP QL NAA+PROBE: NEGATIVE
FLUBV RNA RESP QL NAA+PROBE: NEGATIVE
RSV RNA RESP QL NAA+PROBE: NEGATIVE
SARS-COV-2 RNA RESP QL NAA+PROBE: NEGATIVE

## 2022-12-15 RX ORDER — ALBUTEROL SULFATE 90 UG/1
2 AEROSOL, METERED RESPIRATORY (INHALATION) ONCE
Status: COMPLETED | OUTPATIENT
Start: 2022-12-15 | End: 2022-12-15

## 2022-12-15 RX ADMIN — ALBUTEROL SULFATE 2 PUFF: 90 AEROSOL, METERED RESPIRATORY (INHALATION) at 19:06

## 2022-12-15 NOTE — ED PROVIDER NOTES
History  Chief Complaint   Patient presents with   • Nasal Congestion     Congestion, cough, diarrhea, headache, generalized bodyaches for the past 2 days  Reports being around sick contacts  Pt 6 weeks pregnant  No abdominal complaints  Patient is a 27-year-old female with a PMH of asthma and anxiety who is  currently 6 weeks pregnant who presents for evaluation of URI symptoms  Patient states she started 2 days ago with symptoms including body aches, headaches, runny nose/congestion, cough  She states that she has had sick contacts with people at work  She also notes that 3 days ago she was seen here in the ED after some abdominal/pelvic pain/cramping  At that time she had a confirmatory ultrasound which showed a single IUP gestational age 7 weeks 1 day with a fetal heart tone of 114 bpm   She states that she scheduled a follow-up appointment with [facility]  She states she has a telephone visit on 2022  She states that she hasn't had any vaginal bleeding, discharge, leakage of fluids  She hasn't had any further abdominal or pelvic pain/cramping  She states she has had some nausea without vomiting  She does have a hx of asthma and takes daily medications  She states she used her albuterol inhaler for asthma symptoms prior to arrival and states this provided relief  She denies fever, chills, chest pain, shortness of breath, ear pain, sore throat, rash  Prior to Admission Medications   Prescriptions Last Dose Informant Patient Reported? Taking?    albuterol (2 5 mg/3 mL) 0 083 % nebulizer solution   No No   Sig: Take 3 mL (2 5 mg total) by nebulization every 6 (six) hours as needed for wheezing or shortness of breath   albuterol (2 5 mg/3 mL) 0 083 % nebulizer solution   No No   Sig: Take 3 mL (2 5 mg total) by nebulization every 6 (six) hours as needed for wheezing or shortness of breath   albuterol (PROVENTIL HFA,VENTOLIN HFA) 90 mcg/act inhaler   No No   Sig: Inhale 2 puffs every 6 (six) hours as needed for wheezing or shortness of breath   benzonatate (TESSALON PERLES) 100 mg capsule   No No   Sig: Take 1 capsule (100 mg total) by mouth 3 (three) times a day   Patient not taking: Reported on 12/10/2021    dextromethorphan-guaifenesin (MUCINEX DM)  MG per 12 hr tablet   No No   Sig: Take 1 tablet by mouth every 12 (twelve) hours as needed for cough   doxylamine (UNISOM) 25 MG tablet   No No   Sig: Take 0 5 tablets (12 5 mg total) by mouth daily at bedtime as needed for sleep or nausea   fluticasone (FLOVENT HFA) 110 MCG/ACT inhaler   Yes No   Sig: Inhale 2 puffs 2 (two) times a day Rinse mouth after use  fluticasone-vilanterol (Breo Ellipta) 200-25 MCG/INH inhaler   No No   Sig: Inhale 1 puff daily Rinse mouth after use  Patient not taking: Reported on 12/10/2021    fluticasone-vilanterol (Breo Ellipta) 200-25 MCG/INH inhaler   No No   Sig: Inhale 1 puff daily Rinse mouth after use  ibuprofen (MOTRIN) 600 mg tablet   No No   Sig: Take 1 tablet (600 mg total) by mouth every 6 (six) hours as needed for moderate pain   montelukast (SINGULAIR) 10 mg tablet   Yes No   Sig: Take 10 mg by mouth daily at bedtime   predniSONE 10 mg tablet   No No   Sig: Take 40 mg x 3 days, then 30 mg x 3 days, then 20 mg x 3 days, then 10 mg x 3 days   Patient not taking: Reported on 12/10/2021    pyridoxine (B-6) 25 MG tablet   No No   Sig: Take 1 tablet (25 mg total) by mouth 3 (three) times a day as needed (nausea)      Facility-Administered Medications: None       Past Medical History:   Diagnosis Date   • Anxiety    • Asthma        History reviewed  No pertinent surgical history  History reviewed  No pertinent family history  I have reviewed and agree with the history as documented      E-Cigarette/Vaping   • E-Cigarette Use Never User      E-Cigarette/Vaping Substances   • Nicotine No    • THC No    • CBD No    • Flavoring No    • Other No    • Unknown No      Social History     Tobacco Use   • Smoking status: Never   • Smokeless tobacco: Never   Vaping Use   • Vaping Use: Never used   Substance Use Topics   • Alcohol use: No   • Drug use: No       Review of Systems   Constitutional: Negative for chills and fever  HENT: Positive for congestion and rhinorrhea  Negative for ear pain and sore throat  Respiratory: Positive for cough  Negative for shortness of breath  Cardiovascular: Negative for chest pain  Gastrointestinal: Positive for nausea  Negative for abdominal pain and vomiting  Genitourinary: Negative for difficulty urinating, pelvic pain, vaginal bleeding, vaginal discharge and vaginal pain  Musculoskeletal: Positive for myalgias  Skin: Negative for rash  Neurological: Positive for headaches  All other systems reviewed and are negative  Physical Exam  Physical Exam  Vitals and nursing note reviewed  Constitutional:       General: She is not in acute distress  Appearance: Normal appearance  She is normal weight  She is not ill-appearing, toxic-appearing or diaphoretic  HENT:      Head: Normocephalic and atraumatic  Right Ear: Tympanic membrane, ear canal and external ear normal       Left Ear: Tympanic membrane, ear canal and external ear normal       Nose: Congestion present  Mouth/Throat:      Mouth: Mucous membranes are moist       Pharynx: Oropharynx is clear  No oropharyngeal exudate or posterior oropharyngeal erythema  Eyes:      Extraocular Movements: Extraocular movements intact  Conjunctiva/sclera: Conjunctivae normal    Cardiovascular:      Rate and Rhythm: Normal rate and regular rhythm  Heart sounds: Normal heart sounds  No murmur heard  Pulmonary:      Effort: Pulmonary effort is normal  No respiratory distress  Breath sounds: Normal breath sounds  No stridor  No wheezing, rhonchi or rales  Abdominal:      General: Abdomen is flat  Bowel sounds are normal  There is no distension  Palpations: Abdomen is soft  Tenderness: There is no abdominal tenderness  There is no guarding  Musculoskeletal:         General: Normal range of motion  Cervical back: Normal range of motion  Skin:     General: Skin is warm and dry  Neurological:      Mental Status: She is alert  Psychiatric:         Mood and Affect: Mood normal          Vital Signs  ED Triage Vitals   Temperature Pulse Respirations Blood Pressure SpO2   12/15/22 1537 12/15/22 1537 12/15/22 1537 12/15/22 1538 12/15/22 1537   97 8 °F (36 6 °C) 99 16 119/70 100 %      Temp Source Heart Rate Source Patient Position - Orthostatic VS BP Location FiO2 (%)   12/15/22 1537 12/15/22 1537 12/15/22 1537 12/15/22 1537 --   Oral Monitor Sitting Right arm       Pain Score       --                  Vitals:    12/15/22 1537 12/15/22 1538   BP:  119/70   Pulse: 99    Patient Position - Orthostatic VS: Sitting Sitting         Visual Acuity      ED Medications  Medications   albuterol (PROVENTIL HFA,VENTOLIN HFA) inhaler 2 puff (2 puffs Inhalation Given 12/15/22 1906)       Diagnostic Studies  Results Reviewed     Procedure Component Value Units Date/Time    FLU/RSV/COVID - if FLU/RSV clinically relevant [[id number]]  (Normal) Collected: 12/15/22 1622    Lab Status: Final result Specimen: Nares from Nasopharyngeal Swab Updated: 12/15/22 1736     SARS-CoV-2 Negative     INFLUENZA A PCR Negative     INFLUENZA B PCR Negative     RSV PCR Negative    Narrative:      FOR PEDIATRIC PATIENTS - copy/paste COVID Guidelines URL to browser: [email or web address]    SARS-CoV-2 assay is a Nucleic Acid Amplification assay intended for the  qualitative detection of nucleic acid from SARS-CoV-2 in nasopharyngeal  swabs  Results are for the presumptive identification of SARS-CoV-2 RNA      Positive results are indicative of infection with SARS-CoV-2, the virus  causing COVID-19, but do not rule out bacterial infection or co-infection  with other viruses  Laboratories within the [place] and its  territories are required to report all positive results to the appropriate  public health authorities  Negative results do not preclude SARS-CoV-2  infection and should not be used as the sole basis for treatment or other  patient management decisions  Negative results must be combined with  clinical observations, patient history, and epidemiological information  This test has not been FDA cleared or approved  This test has been authorized by FDA under an Emergency Use Authorization  (EUA)  This test is only authorized for the duration of time the  declaration that circumstances exist justifying the authorization of the  emergency use of an in vitro diagnostic tests for detection of SARS-CoV-2  virus and/or diagnosis of COVID-19 infection under section 564(b)(1) of  the Act, 21 U  S C  849ASW-5(J)(7), unless the authorization is terminated  or revoked sooner  The test has been validated but independent review by FDA  and CLIA is pending  Test performed using [employer] GeneXpert: This RT-PCR assay targets N2,  a region unique to SARS-CoV-2  A conserved region in the E-gene was chosen  for pan-Sarbecovirus detection which includes SARS-CoV-2  According to CMS-2020-01-R, this platform meets the definition of high-throughput technology  US OB < 14 weeks with transvaginal   Final Result by Shaun Orourke MD (12/15 1855)      Single live intrauterine gestation at 7 weeks 0 days (range +/- 4)  ANGELITA of 8/3/2023  Workstation performed: [id number]                    Procedures  Procedures         ED Course                                             MDM  Number of Diagnoses or Management Options  Viral URI with cough  Diagnosis management comments: Will send covid/flu/rsv testing  Patient recently had confirmatory US done on 12/12/22- Single live intrauterine gestation at 6 weeks 1 day      Attempted bedside TAUS to check fetal tones- unable to obtain after multiple attempts by providers in ED  TT sent to OBGYN resident on call Dr Gunner Damico - If unable to get tones with a TAUS (which is hard at 6wk) then patient needs a formal scan  Discussed with US tech- informed need repeat scan to check FHT per OB request      Formal OB US- Single live intrauterine gestation at 7 weeks 0 days (range +/- 4)  ANGELITA of 8/3/2023    Covid/flu/RSV negative  Discussed results with OBGYN resident- patient able to be discharged home and follow up outpatient with her OBGYN  Discussed all results with patient  Discussed viral syndrome and supportive care  Patient asks for albuterol inhaler as she doesn't have one  Will dispense from ED  Instructed to follow up with her OBGYN office in 1 day and discuss ED visit  Instructed on strict return precautions if symptoms worsen or new symptoms arise  Patient states understanding and agrees with plan  Amount and/or Complexity of Data Reviewed  Clinical lab tests: ordered  Tests in the radiology section of CPT®: ordered and reviewed    Patient Progress  Patient progress: stable      Disposition  Final diagnoses:   Viral URI with cough     Time reflects when diagnosis was documented in both MDM as applicable and the Disposition within this note     Time User Action Codes Description Comment    12/15/2022  6:59 PM Shaun Kenney Add [J06 9] Viral URI with cough       ED Disposition     ED Disposition   Discharge    Condition   Stable    Date/Time   Thu Dec 15, 2022  6:58 PM    Comment   [facility]  Follow-up Information     Follow up With Specialties Details Why Contact Info Additional Information    Antonietta Wynn,  Family Medicine Schedule an appointment as soon as possible for a visit in 1 day  [facility]  [phone]       Follow up with your OBGYN in 1-2 days   Discuss ED visit and review results from your visit           [facility] Obstetrics and Gynecology Schedule an appointment as soon as possible for a visit  As needed if you can not see your OBGYN [address]  [address] [phone]  [address], [address], [address] [facility], [place], [address] [place] [address], [address] [facility] Emergency [facility]  If symptoms worsen [facility] [phone]  [address] [place] [address] Emergency Department, [address] [facility] [address] , [facility], [address] [facility], [address]          Discharge Medication List as of 12/15/2022  7:38 PM      CONTINUE these medications which have NOT CHANGED    Details   !! albuterol (2 5 mg/3 mL) 0 083 % nebulizer solution Take 3 mL (2 5 mg total) by nebulization every 6 (six) hours as needed for wheezing or shortness of breath, Starting Fri 12/10/2021, Normal      !! albuterol (2 5 mg/3 mL) 0 083 % nebulizer solution Take 3 mL (2 5 mg total) by nebulization every 6 (six) hours as needed for wheezing or shortness of breath, Starting Mon 4/11/2022, Normal      albuterol (PROVENTIL HFA,VENTOLIN HFA) 90 mcg/act inhaler Inhale 2 puffs every 6 (six) hours as needed for wheezing or shortness of breath, Starting Fri 12/10/2021, Normal      benzonatate (TESSALON PERLES) 100 mg capsule Take 1 capsule (100 mg total) by mouth 3 (three) times a day, Starting Mon 11/22/2021, Normal      dextromethorphan-guaifenesin (MUCINEX DM)  MG per 12 hr tablet Take 1 tablet by mouth every 12 (twelve) hours as needed for cough, Starting Mon 11/22/2021, Normal      doxylamine (UNISOM) 25 MG tablet Take 0 5 tablets (12 5 mg total) by mouth daily at bedtime as needed for sleep or nausea, Starting Mon 12/12/2022, Normal      fluticasone (FLOVENT HFA) 110 MCG/ACT inhaler Inhale 2 puffs 2 (two) times a day Rinse mouth after use , Historical Med fluticasone-vilanterol (Breo Ellipta) 200-25 MCG/INH inhaler Inhale 1 puff daily Rinse mouth after use , Starting Mon 11/22/2021, Until Wed 12/22/2021, Normal      fluticasone-vilanterol (Breo Ellipta) 200-25 MCG/INH inhaler Inhale 1 puff daily Rinse mouth after use , Starting Fri 12/10/2021, Until Sun 1/9/2022, Normal      ibuprofen (MOTRIN) 600 mg tablet Take 1 tablet (600 mg total) by mouth every 6 (six) hours as needed for moderate pain, Starting Mon 4/11/2022, Normal      montelukast (SINGULAIR) 10 mg tablet Take 10 mg by mouth daily at bedtime, Historical Med      predniSONE 10 mg tablet Take 40 mg x 3 days, then 30 mg x 3 days, then 20 mg x 3 days, then 10 mg x 3 days, Normal      pyridoxine (B-6) 25 MG tablet Take 1 tablet (25 mg total) by mouth 3 (three) times a day as needed (nausea), Starting Mon 12/12/2022, Normal       !! - Potential duplicate medications found  Please discuss with provider  No discharge procedures on file      PDMP Review     None          ED Provider  Electronically Signed by           Carmelita Pop PA-C  12/15/22 2059

## 2022-12-21 ENCOUNTER — HOSPITAL ENCOUNTER (EMERGENCY)
Facility: HOSPITAL | Age: 22
Discharge: HOME | End: 2022-12-21
Attending: EMERGENCY MEDICINE

## 2022-12-21 VITALS
TEMPERATURE: 98.1 F | OXYGEN SATURATION: 100 % | RESPIRATION RATE: 18 BRPM | SYSTOLIC BLOOD PRESSURE: 110 MMHG | HEART RATE: 80 BPM | DIASTOLIC BLOOD PRESSURE: 64 MMHG

## 2022-12-21 DIAGNOSIS — U07.1 COVID-19: Primary | ICD-10-CM

## 2022-12-21 DIAGNOSIS — R11.2 NAUSEA AND VOMITING: ICD-10-CM

## 2022-12-21 LAB
ALBUMIN SERPL BCP-MCNC: 3.9 G/DL (ref 3.5–5)
ALP SERPL-CCNC: 57 U/L (ref 46–116)
ALT SERPL W P-5'-P-CCNC: 40 U/L (ref 12–78)
ANION GAP SERPL CALCULATED.3IONS-SCNC: 9 MMOL/L (ref 4–13)
AST SERPL W P-5'-P-CCNC: 23 U/L (ref 5–45)
BASOPHILS # BLD AUTO: 0.05 THOUSANDS/ÂΜL (ref 0–0.1)
BASOPHILS NFR BLD AUTO: 0 % (ref 0–1)
BILIRUB SERPL-MCNC: 0.21 MG/DL (ref 0.2–1)
BILIRUB UR QL STRIP: NEGATIVE
BUN SERPL-MCNC: 9 MG/DL (ref 5–25)
CALCIUM SERPL-MCNC: 9.2 MG/DL (ref 8.3–10.1)
CHLORIDE SERPL-SCNC: 101 MMOL/L (ref 96–108)
CLARITY UR: NORMAL
CO2 SERPL-SCNC: 26 MMOL/L (ref 21–32)
COLOR UR: YELLOW
CREAT SERPL-MCNC: 0.78 MG/DL (ref 0.6–1.3)
EOSINOPHIL # BLD AUTO: 1.1 THOUSAND/ÂΜL (ref 0–0.61)
EOSINOPHIL NFR BLD AUTO: 10 % (ref 0–6)
ERYTHROCYTE [DISTWIDTH] IN BLOOD BY AUTOMATED COUNT: 11.8 % (ref 11.6–15.1)
FLUAV RNA RESP QL NAA+PROBE: NEGATIVE
FLUBV RNA RESP QL NAA+PROBE: NEGATIVE
GFR SERPL CREATININE-BSD FRML MDRD: 109 ML/MIN/1.73SQ M
GLUCOSE SERPL-MCNC: 79 MG/DL (ref 65–140)
GLUCOSE UR STRIP-MCNC: NEGATIVE MG/DL
HCT VFR BLD AUTO: 34.1 % (ref 34.8–46.1)
HGB BLD-MCNC: 11.3 G/DL (ref 11.5–15.4)
HGB UR QL STRIP.AUTO: NEGATIVE
IMM GRANULOCYTES # BLD AUTO: 0.04 THOUSAND/UL (ref 0–0.2)
IMM GRANULOCYTES NFR BLD AUTO: 0 % (ref 0–2)
KETONES UR STRIP-MCNC: NEGATIVE MG/DL
LEUKOCYTE ESTERASE UR QL STRIP: NEGATIVE
LYMPHOCYTES # BLD AUTO: 1.63 THOUSANDS/ÂΜL (ref 0.6–4.47)
LYMPHOCYTES NFR BLD AUTO: 15 % (ref 14–44)
MCH RBC QN AUTO: 31.4 PG (ref 26.8–34.3)
MCHC RBC AUTO-ENTMCNC: 33.1 G/DL (ref 31.4–37.4)
MCV RBC AUTO: 95 FL (ref 82–98)
MONOCYTES # BLD AUTO: 1.45 THOUSAND/ÂΜL (ref 0.17–1.22)
MONOCYTES NFR BLD AUTO: 13 % (ref 4–12)
NEUTROPHILS # BLD AUTO: 6.98 THOUSANDS/ÂΜL (ref 1.85–7.62)
NEUTS SEG NFR BLD AUTO: 62 % (ref 43–75)
NITRITE UR QL STRIP: NEGATIVE
NRBC BLD AUTO-RTO: 0 /100 WBCS
PH UR STRIP.AUTO: 6 [PH]
PLATELET # BLD AUTO: 253 THOUSANDS/UL (ref 149–390)
PMV BLD AUTO: 9.8 FL (ref 8.9–12.7)
POTASSIUM SERPL-SCNC: 4.3 MMOL/L (ref 3.5–5.3)
PROT SERPL-MCNC: 7.8 G/DL (ref 6.4–8.4)
PROT UR STRIP-MCNC: NEGATIVE MG/DL
RBC # BLD AUTO: 3.6 MILLION/UL (ref 3.81–5.12)
RSV RNA RESP QL NAA+PROBE: NEGATIVE
SARS-COV-2 RNA RESP QL NAA+PROBE: POSITIVE
SODIUM SERPL-SCNC: 136 MMOL/L (ref 135–147)
SP GR UR STRIP.AUTO: >=1.03 (ref 1–1.03)
UROBILINOGEN UR QL STRIP.AUTO: 1 E.U./DL
WBC # BLD AUTO: 11.25 THOUSAND/UL (ref 4.31–10.16)

## 2022-12-21 RX ORDER — DOXYLAMINE SUCCINATE AND PYRIDOXINE HYDROCHLORIDE, DELAYED RELEASE TABLETS 10 MG/10 MG 10; 10 MG/1; MG/1
1 TABLET, DELAYED RELEASE ORAL
Qty: 7 TABLET | Refills: 0 | Status: SHIPPED | OUTPATIENT
Start: 2022-12-21

## 2022-12-21 RX ORDER — PROMETHAZINE HYDROCHLORIDE 25 MG/ML
12.5 INJECTION, SOLUTION INTRAMUSCULAR; INTRAVENOUS ONCE
Status: COMPLETED | OUTPATIENT
Start: 2022-12-21 | End: 2022-12-21

## 2022-12-21 RX ORDER — ALBUTEROL SULFATE 90 UG/1
2 AEROSOL, METERED RESPIRATORY (INHALATION) EVERY 4 HOURS PRN
Qty: 6.7 G | Refills: 0 | Status: SHIPPED | OUTPATIENT
Start: 2022-12-21 | End: 2023-12-21

## 2022-12-21 RX ORDER — ALBUTEROL SULFATE 90 UG/1
2 AEROSOL, METERED RESPIRATORY (INHALATION) ONCE
Status: COMPLETED | OUTPATIENT
Start: 2022-12-21 | End: 2022-12-21

## 2022-12-21 RX ORDER — GUAIFENESIN 100 MG/5ML
200 SYRUP ORAL EVERY 4 HOURS PRN
Qty: 60 ML | Refills: 0 | Status: SHIPPED | OUTPATIENT
Start: 2022-12-21

## 2022-12-21 RX ORDER — ONDANSETRON 2 MG/ML
4 INJECTION INTRAMUSCULAR; INTRAVENOUS ONCE
Status: COMPLETED | OUTPATIENT
Start: 2022-12-21 | End: 2022-12-21

## 2022-12-21 RX ORDER — ACETAMINOPHEN 325 MG/1
650 TABLET ORAL ONCE
Status: COMPLETED | OUTPATIENT
Start: 2022-12-21 | End: 2022-12-21

## 2022-12-21 RX ADMIN — ONDANSETRON 4 MG: 2 INJECTION INTRAMUSCULAR; INTRAVENOUS at 08:14

## 2022-12-21 RX ADMIN — ALBUTEROL SULFATE 2 PUFF: 90 AEROSOL, METERED RESPIRATORY (INHALATION) at 08:54

## 2022-12-21 RX ADMIN — SODIUM CHLORIDE 1000 ML: 0.9 INJECTION, SOLUTION INTRAVENOUS at 08:14

## 2022-12-21 RX ADMIN — ACETAMINOPHEN 650 MG: 325 TABLET, FILM COATED ORAL at 08:15

## 2022-12-21 RX ADMIN — PROMETHAZINE HYDROCHLORIDE 12.5 MG: 25 INJECTION INTRAMUSCULAR; INTRAVENOUS at 08:55

## 2022-12-21 NOTE — Clinical Note
Odalis Brown accompanied Greyson Wren to the emergency department on 12/21/2022  Return date if applicable: If you have any questions or concerns, please don't hesitate to call        Evelyn Landrum

## 2022-12-21 NOTE — ED PROVIDER NOTES
History  Chief Complaint   Patient presents with   • Cough     Pt reports cough for one week  61-year-old female P0 approximately 7 weeks pregnant  Was seen here 1 week ago was diagnosed with viral syndrome negative flu negative COVID-negative RSV  Now she has a worsening dry hacking cough body aches headache subjective fever no shortness of breath or chest pain  She had been vomiting 2 days ago and has little p o  intake she attributes that to being pregnant  Denies any abdominal pain no vaginal discharge no vaginal gush no vaginal bleeding  No back pain  She is not tachycardic nor tachypneic nor hypoxic  Differential diagnosis includes not limited to COVID, influenza, RSV, pneumonia less likely          Prior to Admission Medications   Prescriptions Last Dose Informant Patient Reported? Taking?   benzonatate (TESSALON PERLES) 100 mg capsule   No No   Sig: Take 1 capsule (100 mg total) by mouth 3 (three) times a day   Patient not taking: Reported on 12/10/2021    fluticasone (FLOVENT HFA) 110 MCG/ACT inhaler   Yes No   Sig: Inhale 2 puffs 2 (two) times a day Rinse mouth after use  fluticasone-vilanterol (Breo Ellipta) 200-25 MCG/INH inhaler   No No   Sig: Inhale 1 puff daily Rinse mouth after use  Patient not taking: Reported on 12/10/2021    fluticasone-vilanterol (Breo Ellipta) 200-25 MCG/INH inhaler   No No   Sig: Inhale 1 puff daily Rinse mouth after use     ibuprofen (MOTRIN) 600 mg tablet   No No   Sig: Take 1 tablet (600 mg total) by mouth every 6 (six) hours as needed for moderate pain   montelukast (SINGULAIR) 10 mg tablet   Yes No   Sig: Take 10 mg by mouth daily at bedtime   predniSONE 10 mg tablet   No No   Sig: Take 40 mg x 3 days, then 30 mg x 3 days, then 20 mg x 3 days, then 10 mg x 3 days   Patient not taking: Reported on 12/10/2021    pyridoxine (B-6) 25 MG tablet   No No   Sig: Take 1 tablet (25 mg total) by mouth 3 (three) times a day as needed (nausea)      Facility-Administered Medications: None       Past Medical History:   Diagnosis Date   • Anxiety    • Asthma        History reviewed  No pertinent surgical history  History reviewed  No pertinent family history  I have reviewed and agree with the history as documented  E-Cigarette/Vaping   • E-Cigarette Use Never User      E-Cigarette/Vaping Substances   • Nicotine No    • THC No    • CBD No    • Flavoring No    • Other No    • Unknown No      Social History     Tobacco Use   • Smoking status: Never   • Smokeless tobacco: Never   Vaping Use   • Vaping Use: Never used   Substance Use Topics   • Alcohol use: No   • Drug use: No       Review of Systems   Constitutional: Positive for fever  Negative for chills, diaphoresis and fatigue  HENT: Negative for congestion, ear pain, nosebleeds and sore throat  Eyes: Negative for photophobia, pain, discharge and visual disturbance  Respiratory: Positive for choking  Negative for cough, chest tightness, shortness of breath and wheezing  Cardiovascular: Negative for chest pain and palpitations  Gastrointestinal: Negative for abdominal distention, abdominal pain, diarrhea and vomiting  Genitourinary: Negative  Negative for dysuria, flank pain, frequency and hematuria  Musculoskeletal: Positive for myalgias  Negative for arthralgias, back pain, gait problem and joint swelling  Skin: Negative for color change and rash  Neurological: Negative for dizziness, seizures, syncope and headaches  Psychiatric/Behavioral: Negative for behavioral problems and confusion  The patient is not nervous/anxious  All other systems reviewed and are negative  Physical Exam  Physical Exam  Vitals and nursing note reviewed  Constitutional:       General: She is not in acute distress  Appearance: Normal appearance  She is not ill-appearing, toxic-appearing or diaphoretic  HENT:      Head: Normocephalic and atraumatic        Right Ear: Tympanic membrane, ear canal and external ear normal       Left Ear: Tympanic membrane, ear canal and external ear normal       Nose: Nose normal  No congestion or rhinorrhea  Mouth/Throat:      Mouth: Mucous membranes are moist       Pharynx: Oropharynx is clear  No oropharyngeal exudate or posterior oropharyngeal erythema  Eyes:      Extraocular Movements: Extraocular movements intact  Conjunctiva/sclera: Conjunctivae normal       Pupils: Pupils are equal, round, and reactive to light  Cardiovascular:      Rate and Rhythm: Normal rate and regular rhythm  Pulmonary:      Effort: Pulmonary effort is normal  No respiratory distress  Breath sounds: Normal breath sounds  Abdominal:      General: Bowel sounds are normal       Palpations: Abdomen is soft  Tenderness: There is no abdominal tenderness  Musculoskeletal:         General: Normal range of motion  Cervical back: Normal range of motion and neck supple  No rigidity or tenderness  Right lower leg: No edema  Left lower leg: No edema  Lymphadenopathy:      Cervical: No cervical adenopathy  Skin:     General: Skin is warm and dry  Capillary Refill: Capillary refill takes less than 2 seconds  Findings: No rash  Neurological:      General: No focal deficit present  Mental Status: She is alert and oriented to person, place, and time  Mental status is at baseline     Psychiatric:         Mood and Affect: Mood normal          Behavior: Behavior normal          Vital Signs  ED Triage Vitals   Temperature Pulse Respirations Blood Pressure SpO2   12/21/22 0619 12/21/22 0619 12/21/22 0619 12/21/22 0619 12/21/22 0619   98 1 °F (36 7 °C) 96 22 110/82 99 %      Temp Source Heart Rate Source Patient Position - Orthostatic VS BP Location FiO2 (%)   12/21/22 0619 12/21/22 0619 12/21/22 0859 12/21/22 0619 --   Temporal Monitor Lying Right arm       Pain Score       12/21/22 0814       8           Vitals:    12/21/22 0619 12/21/22 0859   BP: 110/82 110/57   Pulse: 96 88   Patient Position - Orthostatic VS:  Lying         Visual Acuity      ED Medications  Medications   sodium chloride 0 9 % bolus 1,000 mL (1,000 mL Intravenous New Bag 12/21/22 0814)   ondansetron (ZOFRAN) injection 4 mg (4 mg Intravenous Given 12/21/22 0814)   acetaminophen (TYLENOL) tablet 650 mg (650 mg Oral Given 12/21/22 0815)   promethazine (PHENERGAN) injection 12 5 mg (12 5 mg Intravenous Given 12/21/22 0855)   albuterol (PROVENTIL HFA,VENTOLIN HFA) inhaler 2 puff (2 puffs Inhalation Given 12/21/22 0854)       Diagnostic Studies  Results Reviewed     Procedure Component Value Units Date/Time    CBC and differential [[id number]]  (Abnormal) Collected: 12/21/22 0907    Lab Status: Final result Specimen: Blood from Arm, Right Updated: 12/21/22 0918     WBC 11 25 Thousand/uL      RBC 3 60 Million/uL      Hemoglobin 11 3 g/dL      Hematocrit 34 1 %      MCV 95 fL      MCH 31 4 pg      MCHC 33 1 g/dL      RDW 11 8 %      MPV 9 8 fL      Platelets 900 Thousands/uL      nRBC 0 /100 WBCs      Neutrophils Relative 62 %      Immat GRANS % 0 %      Lymphocytes Relative 15 %      Monocytes Relative 13 %      Eosinophils Relative 10 %      Basophils Relative 0 %      Neutrophils Absolute 6 98 Thousands/µL      Immature Grans Absolute 0 04 Thousand/uL      Lymphocytes Absolute 1 63 Thousands/µL      Monocytes Absolute 1 45 Thousand/µL      Eosinophils Absolute 1 10 Thousand/µL      Basophils Absolute 0 05 Thousands/µL     Comprehensive metabolic panel [[id number]] Collected: 12/21/22 0803    Lab Status: Final result Specimen: Blood from Arm, Right Updated: 12/21/22 0915     Sodium 136 mmol/L      Potassium 4 3 mmol/L      Chloride 101 mmol/L      CO2 26 mmol/L      ANION GAP 9 mmol/L      BUN 9 mg/dL      Creatinine 0 78 mg/dL      Glucose 79 mg/dL      Calcium 9 2 mg/dL      AST 23 U/L      ALT 40 U/L      Alkaline Phosphatase 57 U/L      Total Protein 7 8 g/dL      Albumin 3 9 g/dL      Total Bilirubin 0 21 mg/dL eGFR 109 ml/min/1 73sq m     Narrative:      [facility] guidelines for Chronic Kidney Disease (CKD):   •  Stage 1 with normal or high GFR (GFR > 90 mL/min/1 73 square meters)  •  Stage 2 Mild CKD (GFR = 60-89 mL/min/1 73 square meters)  •  Stage 3A Moderate CKD (GFR = 45-59 mL/min/1 73 square meters)  •  Stage 3B Moderate CKD (GFR = 30-44 mL/min/1 73 square meters)  •  Stage 4 Severe CKD (GFR = 15-29 mL/min/1 73 square meters)  •  Stage 5 End Stage CKD (GFR <15 mL/min/1 73 square meters)  Note: GFR calculation is accurate only with a steady state creatinine    FLU/RSV/COVID - if FLU/RSV clinically relevant [[id number]]  (Abnormal) Collected: 12/21/22 0756    Lab Status: Final result Specimen: Nares from Nose Updated: 12/21/22 0900     SARS-CoV-2 Positive     INFLUENZA A PCR Negative     INFLUENZA B PCR Negative     RSV PCR Negative    Narrative:      FOR PEDIATRIC PATIENTS - copy/paste COVID Guidelines URL to browser: [email or web address]  [employer]    SARS-CoV-2 assay is a Nucleic Acid Amplification assay intended for the  qualitative detection of nucleic acid from SARS-CoV-2 in nasopharyngeal  swabs  Results are for the presumptive identification of SARS-CoV-2 RNA  Positive results are indicative of infection with SARS-CoV-2, the virus  causing COVID-19, but do not rule out bacterial infection or co-infection  with other viruses  Laboratories within the [place] and its  territories are required to report all positive results to the appropriate  public health authorities  Negative results do not preclude SARS-CoV-2  infection and should not be used as the sole basis for treatment or other  patient management decisions  Negative results must be combined with  clinical observations, patient history, and epidemiological information  This test has not been FDA cleared or approved      This test has been authorized by FDA under an Emergency Use Authorization  (EUA)  This test is only authorized for the duration of time the  declaration that circumstances exist justifying the authorization of the  emergency use of an in vitro diagnostic tests for detection of SARS-CoV-2  virus and/or diagnosis of COVID-19 infection under section 564(b)(1) of  the Act, 21 U  S C  296ABV-2(W)(4), unless the authorization is terminated  or revoked sooner  The test has been validated but independent review by FDA  and CLIA is pending  Test performed using [employer] GeneXpert: This RT-PCR assay targets N2,  a region unique to SARS-CoV-2  A conserved region in the E-gene was chosen  for pan-Sarbecovirus detection which includes SARS-CoV-2  According to CMS-2020-01-R, this platform meets the definition of high-throughput technology  UA w Reflex to Microscopic w Reflex to Culture [[id number]] Collected: 12/21/22 0755    Lab Status: Final result Specimen: Urine, Clean Catch Updated: 12/21/22 0836     Color, UA Yellow     Clarity, UA Cloudy     Specific Gravity, UA >=1 030     pH, UA 6 0     Leukocytes, UA Negative     Nitrite, UA Negative     Protein, UA Negative mg/dl      Glucose, UA Negative mg/dl      Ketones, UA Negative mg/dl      Urobilinogen, UA 1 0 E U /dl      Bilirubin, UA Negative     Occult Blood, UA Negative     URINE COMMENT --    Urine culture [[id number]] Collected: 12/21/22 0755    Lab Status: In process Specimen: Urine, Clean Catch Updated: 12/21/22 0836                 No orders to display              Procedures  Procedures         ED Course                                             MDM  Number of Diagnoses or Management Options  COVID-19: new and requires workup  Nausea and vomiting: new and requires workup  Diagnosis management comments: 61-year-old female patient who is approximately 7 weeks pregnant without abdominal pain no abdominal cramping no vaginal gush no vaginal discharge no vaginal bleeding no back pain or signs of labor    Presents with a cough nonfocal headache body aches subjective fever  Positive for COVID had an albuterol 2 puffs and promethazine cough has ceased there is been no shortness of breath or chest pain  Also had some vomiting throughout the week and some nausea was given a bag of fluid Zofran and feels better  She is stable for discharge all labs reviewed       Amount and/or Complexity of Data Reviewed  Clinical lab tests: ordered and reviewed  Tests in the radiology section of CPT®: ordered and reviewed  Tests in the medicine section of CPT®: ordered and reviewed  Decide to obtain previous medical records or to obtain history from someone other than the patient: yes  Review and summarize past medical records: yes  Independent visualization of images, tracings, or specimens: yes    Risk of Complications, Morbidity, and/or Mortality  Presenting problems: moderate  Diagnostic procedures: moderate    Patient Progress  Patient progress: stable      Disposition  Final diagnoses:   COVID-19   Nausea and vomiting     Time reflects when diagnosis was documented in both MDM as applicable and the Disposition within this note     Time User Action Codes Description Comment    12/21/2022  9:35 AM Checo Martinez Add [U07 1] COVID-19     12/21/2022  9:36 AM Checo Martinez Add [R11 2] Nausea and vomiting       ED Disposition     ED Disposition   Discharge    Condition   Stable    Date/Time   Wed Dec 21, 2022  9:35 AM    Comment   Yoko Caldwell discharge to home/self care                 Follow-up Information     Follow up With Specialties Details Why Contact Info    Roly Adrienne,  Family Medicine Schedule an appointment as soon as possible for a visit   [address]  [facility] [address]  [phone]            Patient's Medications   Discharge Prescriptions    ALBUTEROL (PROVENTIL HFA,VENTOLIN HFA) 90 MCG/ACT INHALER    Inhale 2 puffs every 4 (four) hours as needed for wheezing       Start Date: 12/21/2022End Date: 12/21/2023       Order Dose: 2 puffs       Quantity: 6 7 g    Refills: 0    DOXYLAMINE-PYRIDOXINE (DICLEGIS) 10-10 MG TBEC    Take 1 tablet by mouth daily at bedtime as needed (nausea)       Start Date: 12/21/2022End Date: --       Order Dose: 1 tablet       Quantity: 7 tablet    Refills: 0    GUAIFENESIN (ROBITUSSIN) 100 MG/5ML ORAL LIQUID    Take 10 mL (200 mg total) by mouth every 4 (four) hours as needed for cough       Start Date: 12/21/2022End Date: --       Order Dose: 200 mg       Quantity: 60 mL    Refills: 0       No discharge procedures on file      PDMP Review     None          ED Provider  Electronically Signed by           Raissa Hale PA-C  12/21/22 0021

## 2022-12-21 NOTE — Clinical Note
Ofelia Dahiana was seen and treated in our emergency department on 12/21/2022  No restrictions            Diagnosis:     Nate Dent  may return to work on return date  She may return on this date: 12/22/2022         If you have any questions or concerns, please don't hesitate to call        Allan Chino    ______________________________           _______________          _______________  [facility] Representative                              Date                                Time

## 2022-12-21 NOTE — Clinical Note
Jose Thomas was seen and treated in our emergency department on 12/21/2022  No restrictions            Diagnosis:     He Martin  may return to work on return date  She may return on this date: 12/22/2022         If you have any questions or concerns, please don't hesitate to call        Viktor Lehman    ______________________________           _______________          _______________  [facility] Representative                              Date                                Time

## 2022-12-22 LAB
BACTERIA UR CULT: ABNORMAL
BACTERIA UR CULT: ABNORMAL

## 2023-01-18 ENCOUNTER — HOSPITAL ENCOUNTER (EMERGENCY)
Facility: HOSPITAL | Age: 23
Discharge: HOME | End: 2023-01-18
Attending: EMERGENCY MEDICINE

## 2023-01-18 VITALS
BODY MASS INDEX: 25.14 KG/M2 | RESPIRATION RATE: 18 BRPM | SYSTOLIC BLOOD PRESSURE: 118 MMHG | TEMPERATURE: 98.1 F | OXYGEN SATURATION: 100 % | DIASTOLIC BLOOD PRESSURE: 58 MMHG | WEIGHT: 160.5 LBS | HEART RATE: 98 BPM

## 2023-01-18 DIAGNOSIS — U07.1 COVID-19: ICD-10-CM

## 2023-01-18 DIAGNOSIS — J45.901 EXACERBATION OF ASTHMA, UNSPECIFIED ASTHMA SEVERITY, UNSPECIFIED WHETHER PERSISTENT: Primary | ICD-10-CM

## 2023-01-18 LAB
ANION GAP SERPL CALCULATED.3IONS-SCNC: 7 MMOL/L (ref 4–13)
BASE EX.OXY STD BLDV CALC-SCNC: 72.7 % (ref 60–80)
BASE EXCESS BLDV CALC-SCNC: -1.6 MMOL/L
BASOPHILS # BLD AUTO: 0.06 THOUSANDS/ÂΜL (ref 0–0.1)
BASOPHILS NFR BLD AUTO: 0 % (ref 0–1)
BUN SERPL-MCNC: 11 MG/DL (ref 5–25)
CALCIUM SERPL-MCNC: 9.1 MG/DL (ref 8.4–10.2)
CHLORIDE SERPL-SCNC: 105 MMOL/L (ref 96–108)
CO2 SERPL-SCNC: 24 MMOL/L (ref 21–32)
CREAT SERPL-MCNC: 0.79 MG/DL (ref 0.6–1.3)
EOSINOPHIL # BLD AUTO: 0.73 THOUSAND/ÂΜL (ref 0–0.61)
EOSINOPHIL NFR BLD AUTO: 5 % (ref 0–6)
ERYTHROCYTE [DISTWIDTH] IN BLOOD BY AUTOMATED COUNT: 12.1 % (ref 11.6–15.1)
FLUAV RNA RESP QL NAA+PROBE: NEGATIVE
FLUBV RNA RESP QL NAA+PROBE: NEGATIVE
GFR SERPL CREATININE-BSD FRML MDRD: 108 ML/MIN/1.73SQ M
GLUCOSE SERPL-MCNC: 88 MG/DL (ref 65–140)
HCO3 BLDV-SCNC: 24.1 MMOL/L (ref 24–30)
HCT VFR BLD AUTO: 36.3 % (ref 34.8–46.1)
HGB BLD-MCNC: 12.3 G/DL (ref 11.5–15.4)
IMM GRANULOCYTES # BLD AUTO: 0.06 THOUSAND/UL (ref 0–0.2)
IMM GRANULOCYTES NFR BLD AUTO: 0 % (ref 0–2)
LYMPHOCYTES # BLD AUTO: 2.81 THOUSANDS/ÂΜL (ref 0.6–4.47)
LYMPHOCYTES NFR BLD AUTO: 18 % (ref 14–44)
MCH RBC QN AUTO: 31.9 PG (ref 26.8–34.3)
MCHC RBC AUTO-ENTMCNC: 33.9 G/DL (ref 31.4–37.4)
MCV RBC AUTO: 94 FL (ref 82–98)
MONOCYTES # BLD AUTO: 1.05 THOUSAND/ÂΜL (ref 0.17–1.22)
MONOCYTES NFR BLD AUTO: 7 % (ref 4–12)
NEUTROPHILS # BLD AUTO: 11.09 THOUSANDS/ÂΜL (ref 1.85–7.62)
NEUTS SEG NFR BLD AUTO: 70 % (ref 43–75)
NRBC BLD AUTO-RTO: 0 /100 WBCS
O2 CT BLDV-SCNC: 13.8 ML/DL
PCO2 BLDV: 44.1 MM HG (ref 42–50)
PH BLDV: 7.36 [PH] (ref 7.3–7.4)
PLATELET # BLD AUTO: 312 THOUSANDS/UL (ref 149–390)
PMV BLD AUTO: 9.5 FL (ref 8.9–12.7)
PO2 BLDV: 40.6 MM HG (ref 35–45)
POTASSIUM SERPL-SCNC: 4 MMOL/L (ref 3.5–5.3)
RBC # BLD AUTO: 3.86 MILLION/UL (ref 3.81–5.12)
RSV RNA RESP QL NAA+PROBE: NEGATIVE
SARS-COV-2 RNA RESP QL NAA+PROBE: NEGATIVE
SODIUM SERPL-SCNC: 136 MMOL/L (ref 135–147)
WBC # BLD AUTO: 15.8 THOUSAND/UL (ref 4.31–10.16)

## 2023-01-18 RX ORDER — ALBUTEROL SULFATE 90 UG/1
2 AEROSOL, METERED RESPIRATORY (INHALATION) ONCE
Status: COMPLETED | OUTPATIENT
Start: 2023-01-18 | End: 2023-01-18

## 2023-01-18 RX ORDER — ALBUTEROL SULFATE 2.5 MG/3ML
2.5 SOLUTION RESPIRATORY (INHALATION) EVERY 6 HOURS PRN
Qty: 75 ML | Refills: 0 | Status: SHIPPED | OUTPATIENT
Start: 2023-01-18 | End: 2023-04-18

## 2023-01-18 RX ORDER — ALBUTEROL SULFATE 90 UG/1
2 AEROSOL, METERED RESPIRATORY (INHALATION) EVERY 4 HOURS PRN
Qty: 18 G | Refills: 0 | Status: SHIPPED | OUTPATIENT
Start: 2023-01-18 | End: 2024-01-18

## 2023-01-18 RX ORDER — SODIUM CHLORIDE FOR INHALATION 0.9 %
12 VIAL, NEBULIZER (ML) INHALATION ONCE
Status: COMPLETED | OUTPATIENT
Start: 2023-01-18 | End: 2023-01-18

## 2023-01-18 RX ORDER — ACETAMINOPHEN 325 MG/1
975 TABLET ORAL ONCE
Status: COMPLETED | OUTPATIENT
Start: 2023-01-18 | End: 2023-01-18

## 2023-01-18 RX ORDER — METHYLPREDNISOLONE SODIUM SUCCINATE 125 MG/2ML
125 INJECTION, POWDER, LYOPHILIZED, FOR SOLUTION INTRAMUSCULAR; INTRAVENOUS ONCE
Status: COMPLETED | OUTPATIENT
Start: 2023-01-18 | End: 2023-01-18

## 2023-01-18 RX ORDER — PREDNISONE 20 MG/1
60 TABLET ORAL DAILY
Qty: 12 TABLET | Refills: 0 | Status: SHIPPED | OUTPATIENT
Start: 2023-01-18 | End: 2023-01-22

## 2023-01-18 RX ADMIN — IPRATROPIUM BROMIDE 1 MG: 0.5 SOLUTION RESPIRATORY (INHALATION) at 20:51

## 2023-01-18 RX ADMIN — ISODIUM CHLORIDE 12 ML: 0.03 SOLUTION RESPIRATORY (INHALATION) at 20:51

## 2023-01-18 RX ADMIN — ALBUTEROL SULFATE 10 MG: 2.5 SOLUTION RESPIRATORY (INHALATION) at 20:50

## 2023-01-18 RX ADMIN — METHYLPREDNISOLONE SODIUM SUCCINATE 125 MG: 125 INJECTION, POWDER, FOR SOLUTION INTRAMUSCULAR; INTRAVENOUS at 20:59

## 2023-01-18 RX ADMIN — ALBUTEROL SULFATE 2 PUFF: 90 AEROSOL, METERED RESPIRATORY (INHALATION) at 22:10

## 2023-01-18 RX ADMIN — ACETAMINOPHEN 975 MG: 325 TABLET ORAL at 21:46

## 2023-01-18 NOTE — Clinical Note
Falcon Shelby was seen and treated in our emergency department on 1/18/2023  Diagnosis: Asthma exacerbation    Yoko  may return to work on return date  She may return on this date: 01/20/2023         If you have any questions or concerns, please don't hesitate to call        Oliver Garner PA-C    ______________________________           _______________          _______________  [facility] Representative                              Date                                Time

## 2023-01-18 NOTE — Clinical Note
Ofelia Dahiana was seen and treated in our emergency department on 1/18/2023  Diagnosis: Asthma exacerbation    Yoko  may return to work on return date  She may return on this date: 01/20/2023         If you have any questions or concerns, please don't hesitate to call        Gail Tanner PA-C    ______________________________           _______________          _______________  [facility] Representative                              Date                                Time

## 2023-01-19 NOTE — DISCHARGE INSTRUCTIONS
You were seen in the emergency department today for asthma exacerbation  Please follow-up with your primary care physician regarding your symptoms  Return sooner to the Emergency Department if increased wheezing, difficulty breathing, fever, vomiting, pain, weakness, dizziness, coughing up blood  Steroids once daily for 4 days  Albuterol neb/inhaler as needed  Follow-up with your OB/GYN as well as pulmonology

## 2023-01-19 NOTE — ED PROVIDER NOTES
History  Chief Complaint   Patient presents with   • Asthma     Patient reports onset of asthma attack ~1300 today  Took neb and rescue inhaler with no relief  +11 weeks pregnant +diaphoretic     Shawna Mckeon is a  currently 11 weeks 3 days 23 y o   female with PMH of and anxiety who presents to the emergency department with an asthma exacerbation  Patient states her symptoms started approximately 1 PM today  States she has had some shortness of breath, wheezing, and dry nonproductive cough  States symptom gradually and  worsened  Did attempt albuterol as well as nebulizer at home with slight improvement but symptoms returned  States she felt well prior to wheezing and cough starting, no recent URI type symptoms like congestion, sore throat, ear pain, fevers, chills  She denies any abdominal pain, nausea, vomiting, diarrhea, vaginal bleeding, vaginal discharge, sudden gush from a vagina or lower abdominal pain/cramping  Patient does have a confirmed IUP, currently no known complications from pregnancy  Has been admitted to the hospital in the past for asthma, recent steroid burst in December while having COVID, denies ever having been intubated for asthma  History provided by:  Patient   used: No    Asthma  Location:  Diffuse wheezing and shortness of breath    Severity:  Moderate  Onset quality:  Gradual  Duration:  7 hours  Timing:  Constant  Progression:  Worsening  Chronicity:  Recurrent  Context:  Gradually increasing  Relieved by:  Nothing  Worsened by:  Exertion  Ineffective treatments:  Neb and albuterol  Associated symptoms: cough and wheezing    Associated symptoms: no abdominal pain, no chest pain, no congestion, no diarrhea, no ear pain, no fatigue, no fever, no headaches, no loss of consciousness, no myalgias, no nausea, no rash, no rhinorrhea, no shortness of breath, no sore throat and no vomiting    Cough:     Cough characteristics:  Non-productive Sputum characteristics:  Nondescript    Severity:  Moderate    Onset quality:  Gradual    Duration:  7 hours    Timing:  Constant    Progression:  Unchanged    Chronicity:  New  Wheezing:     Severity:  Moderate    Onset quality:  Gradual    Duration:  7 hours    Timing:  Constant    Progression:  Unchanged    Chronicity:  New      Prior to Admission Medications   Prescriptions Last Dose Informant Patient Reported? Taking? Doxylamine-Pyridoxine (Diclegis) 10-10 MG TBEC   No No   Sig: Take 1 tablet by mouth daily at bedtime as needed (nausea)   albuterol (PROVENTIL HFA,VENTOLIN HFA) 90 mcg/act inhaler   No No   Sig: Inhale 2 puffs every 4 (four) hours as needed for wheezing   albuterol (PROVENTIL HFA,VENTOLIN HFA) 90 mcg/act inhaler   No Yes   Sig: Inhale 2 puffs every 4 (four) hours as needed for wheezing   benzonatate (TESSALON PERLES) 100 mg capsule   No No   Sig: Take 1 capsule (100 mg total) by mouth 3 (three) times a day   Patient not taking: Reported on 12/10/2021    fluticasone (FLOVENT HFA) 110 MCG/ACT inhaler   Yes No   Sig: Inhale 2 puffs 2 (two) times a day Rinse mouth after use  fluticasone-vilanterol (Breo Ellipta) 200-25 MCG/INH inhaler   No No   Sig: Inhale 1 puff daily Rinse mouth after use     guaiFENesin (ROBITUSSIN) 100 MG/5ML oral liquid   No No   Sig: Take 10 mL (200 mg total) by mouth every 4 (four) hours as needed for cough   ibuprofen (MOTRIN) 600 mg tablet   No No   Sig: Take 1 tablet (600 mg total) by mouth every 6 (six) hours as needed for moderate pain   montelukast (SINGULAIR) 10 mg tablet   Yes No   Sig: Take 10 mg by mouth daily at bedtime   predniSONE 10 mg tablet   No No   Sig: Take 40 mg x 3 days, then 30 mg x 3 days, then 20 mg x 3 days, then 10 mg x 3 days   Patient not taking: Reported on 12/10/2021    pyridoxine (B-6) 25 MG tablet   No No   Sig: Take 1 tablet (25 mg total) by mouth 3 (three) times a day as needed (nausea)      Facility-Administered Medications: None Past Medical History:   Diagnosis Date   • Anxiety    • Asthma        History reviewed  No pertinent surgical history  History reviewed  No pertinent family history  I have reviewed and agree with the history as documented  E-Cigarette/Vaping   • E-Cigarette Use Never User      E-Cigarette/Vaping Substances   • Nicotine No    • THC No    • CBD No    • Flavoring No    • Other No    • Unknown No      Social History     Tobacco Use   • Smoking status: Never   • Smokeless tobacco: Never   Vaping Use   • Vaping Use: Never used   Substance Use Topics   • Alcohol use: No   • Drug use: No       Review of Systems   Constitutional: Negative for activity change, appetite change, chills, diaphoresis, fatigue, fever and unexpected weight change  HENT: Negative for congestion, dental problem, ear pain, mouth sores, nosebleeds, rhinorrhea, sinus pressure, sinus pain, sneezing, sore throat and trouble swallowing  Respiratory: Positive for cough and wheezing  Negative for apnea, choking, chest tightness, shortness of breath and stridor  Cardiovascular: Negative for chest pain, palpitations and leg swelling  Gastrointestinal: Negative for abdominal pain, constipation, diarrhea, nausea and vomiting  Genitourinary: Negative for dysuria, flank pain, frequency and urgency  Musculoskeletal: Negative for arthralgias, joint swelling and myalgias  Skin: Negative for color change, pallor and rash  Neurological: Negative for dizziness, tremors, seizures, loss of consciousness, syncope, speech difficulty, weakness, light-headedness, numbness and headaches  All other systems reviewed and are negative  Physical Exam  Physical Exam  Vitals and nursing note reviewed  Constitutional:       General: She is not in acute distress  Appearance: Normal appearance  She is normal weight  She is not ill-appearing or toxic-appearing  HENT:      Head: Normocephalic and atraumatic        Right Ear: Tympanic membrane, ear canal and external ear normal       Left Ear: Tympanic membrane, ear canal and external ear normal       Nose: Nose normal  No congestion or rhinorrhea  Mouth/Throat:      Mouth: Mucous membranes are moist       Pharynx: Oropharynx is clear  Eyes:      Extraocular Movements: Extraocular movements intact  Pupils: Pupils are equal, round, and reactive to light  Cardiovascular:      Rate and Rhythm: Regular rhythm  Tachycardia present  Pulses: Normal pulses  Heart sounds: Normal heart sounds  No murmur heard  No friction rub  No gallop  Pulmonary:      Effort: Pulmonary effort is normal  No respiratory distress  Breath sounds: Normal breath sounds  No stridor  No wheezing, rhonchi or rales  Comments: Increased work of breathing with tachypnea  Diffuse bilateral wheezing  Dry nonproductive cough  Abdominal:      General: Abdomen is flat  Bowel sounds are normal  There is no distension  Palpations: Abdomen is soft  There is no mass  Tenderness: There is no abdominal tenderness  There is no guarding or rebound  Hernia: No hernia is present  Comments: Soft nontender nondistended  Musculoskeletal:         General: No swelling, tenderness, deformity or signs of injury  Normal range of motion  Cervical back: Normal range of motion  No rigidity or tenderness  Right lower leg: No edema  Left lower leg: No edema  Skin:     General: Skin is warm and dry  Capillary Refill: Capillary refill takes less than 2 seconds  Neurological:      General: No focal deficit present  Mental Status: She is alert and oriented to person, place, and time  Mental status is at baseline  Cranial Nerves: No cranial nerve deficit  Sensory: No sensory deficit  Motor: No weakness        Coordination: Coordination normal          Vital Signs  ED Triage Vitals [01/18/23 2024]   Temperature Pulse Respirations Blood Pressure SpO2   98 1 °F (36 7 °C) (!) 145 (!) 28 134/76 99 %      Temp Source Heart Rate Source Patient Position - Orthostatic VS BP Location FiO2 (%)   Oral Monitor Sitting Right arm --      Pain Score       8           Vitals:    01/18/23 2024 01/18/23 2115 01/18/23 2143 01/18/23 2204   BP: 134/76  131/75 118/58   Pulse: (!) 145 (!) 110 105 98   Patient Position - Orthostatic VS: Sitting  Sitting Sitting         Visual Acuity      ED Medications  Medications   albuterol inhalation solution 10 mg (10 mg Nebulization Given 1/18/23 2050)   ipratropium (ATROVENT) 0 02 % inhalation solution 1 mg (1 mg Nebulization Given 1/18/23 2051)   sodium chloride 0 9 % inhalation solution 12 mL (12 mL Nebulization Given 1/18/23 2051)   methylPREDNISolone sodium succinate (Solu-MEDROL) injection 125 mg (125 mg Intravenous Given 1/18/23 2059)   acetaminophen (TYLENOL) tablet 975 mg (975 mg Oral Given 1/18/23 2146)   albuterol (PROVENTIL HFA,VENTOLIN HFA) inhaler 2 puff (2 puffs Inhalation Given 1/18/23 2210)       Diagnostic Studies  Results Reviewed     Procedure Component Value Units Date/Time    COVID/FLU/RSV [[id number]]  (Normal) Collected: 01/18/23 2139    Lab Status: Final result Specimen: Nares from Nose Updated: 01/18/23 2221     SARS-CoV-2 Negative     INFLUENZA A PCR Negative     INFLUENZA B PCR Negative     RSV PCR Negative    Narrative:      FOR PEDIATRIC PATIENTS - copy/paste COVID Guidelines URL to browser: [email or web address]    SARS-CoV-2 assay is a Nucleic Acid Amplification assay intended for the  qualitative detection of nucleic acid from SARS-CoV-2 in nasopharyngeal  swabs  Results are for the presumptive identification of SARS-CoV-2 RNA  Positive results are indicative of infection with SARS-CoV-2, the virus  causing COVID-19, but do not rule out bacterial infection or co-infection  with other viruses   Laboratories within the [place] and its  territories are required to report all positive results to the appropriate  public health authorities  Negative results do not preclude SARS-CoV-2  infection and should not be used as the sole basis for treatment or other  patient management decisions  Negative results must be combined with  clinical observations, patient history, and epidemiological information  This test has not been FDA cleared or approved  This test has been authorized by FDA under an Emergency Use Authorization  (EUA)  This test is only authorized for the duration of time the  declaration that circumstances exist justifying the authorization of the  emergency use of an in vitro diagnostic tests for detection of SARS-CoV-2  virus and/or diagnosis of COVID-19 infection under section 564(b)(1) of  the Act, 21 U  [place] C  054IQY-6(R)(7), unless the authorization is terminated  or revoked sooner  The test has been validated but independent review by FDA  and CLIA is pending  Test performed using "[employer] GeneXpert: This RT-PCR assay targets N2,  a region unique to SARS-CoV-2  A conserved region in the E-gene was chosen  for pan-Sarbecovirus detection which includes SARS-CoV-2  According to CMS-2020-01-R, this platform meets the definition of high-throughput technology      Basic metabolic panel [[id number]] Collected: 01/18/23 2101    Lab Status: Final result Specimen: Blood from Line, Venous Updated: 01/18/23 2135     Sodium 136 mmol/L      Potassium 4 0 mmol/L      Chloride 105 mmol/L      CO2 24 mmol/L      ANION GAP 7 mmol/L      BUN 11 mg/dL      Creatinine 0 79 mg/dL      Glucose 88 mg/dL      Calcium 9 1 mg/dL      eGFR 108 ml/min/1 73sq m     Narrative:      [place] guidelines for Chronic Kidney Disease (CKD):   •  Stage 1 with normal or high GFR (GFR > 90 mL/min/1 73 square meters)  •  Stage 2 Mild CKD (GFR = 60-89 mL/min/1 73 square meters)  •  Stage 3A Moderate CKD (GFR = 45-59 mL/min/1 73 square meters)  •  Stage 3B Moderate CKD (GFR = 30-44 mL/min/1 73 square meters)  •  Stage 4 Severe CKD (GFR = 15-29 mL/min/1 73 square meters)  •  Stage 5 End Stage CKD (GFR <15 mL/min/1 73 square meters)  Note: GFR calculation is accurate only with a steady state creatinine    Blood gas, venous [[id number]] Collected: 01/18/23 2101    Lab Status: Final result Specimen: Blood from Line, Venous Updated: 01/18/23 2115     pH, Last 7 355     pCO2, Last 44 1 mm Hg      pO2, Last 40 6 mm Hg      HCO3, Last 24 1 mmol/L      Base Excess, Last -1 6 mmol/L      O2 Content, Last 13 8 ml/dL      O2 HGB, VENOUS 72 7 %     CBC and differential [[id number]]  (Abnormal) Collected: 01/18/23 2101    Lab Status: Final result Specimen: Blood from Line, Venous Updated: 01/18/23 2108     WBC 15 80 Thousand/uL      RBC 3 86 Million/uL      Hemoglobin 12 3 g/dL      Hematocrit 36 3 %      MCV 94 fL      MCH 31 9 pg      MCHC 33 9 g/dL      RDW 12 1 %      MPV 9 5 fL      Platelets 630 Thousands/uL      nRBC 0 /100 WBCs      Neutrophils Relative 70 %      Immat GRANS % 0 %      Lymphocytes Relative 18 %      Monocytes Relative 7 %      Eosinophils Relative 5 %      Basophils Relative 0 %      Neutrophils Absolute 11 09 Thousands/µL      Immature Grans Absolute 0 06 Thousand/uL      Lymphocytes Absolute 2 81 Thousands/µL      Monocytes Absolute 1 05 Thousand/µL      Eosinophils Absolute 0 73 Thousand/µL      Basophils Absolute 0 06 Thousands/µL                  No orders to display              Procedures  Procedures         ED Course                                             Medical Decision Making  Patient was seen and examined  in the emergency department for chief complaint of 7 hours of wheezing and shortness of breath as well as cough  The patient presented with similar symptoms  Started gradually  Gradually worsening as well  He did attempt rescue inhaler as well as nebulizer at home with minimal relief    No associated fevers, chills, chest pain, palpitations, abdominal pain, nausea, vomiting  No pregnancy concerns at this time, no vaginal bleeding, vaginal discharge, abdominal pain headaches  On exam patient has increased work of breathing  Bilateral wheezing diffusely, dry nonproductive cough  Tachycardic but regular  Abdomen soft nontender nondistended  No lower extremity edema  DDx including but not limited to: asthma exacerbation, URI, bronchitis, pneumonia; doubt PTX, pneumomediastinum or cardiac etiology  Workup: We will check basic labs including VBG  Heart neb  Steroids  COVID flu RSV  Defer chest x-ray at patient's request at this time-with sudden onset of symptoms and no URI type symptoms prior to onset, doubt pneumonia  9:25 PM  Patient feeling better  FHT 170s  Patient complaints of a mild headache  Not sudden onset  Stated here  VSS  States she would normally take tylenol at home  Will administer      -Prior to discharge patient was feeling much better and states she is back to baseline  Lung sounds are clear bilaterally  Blood pressure within normal limits, no tachycardia or hypoxia  Patient ambulated without hypoxia  Will discharge on steroid burst as well as albuterol inhaler and refill of nebulized treatment  Recommend close follow-up with OB/GYN as well as pulmonology/PCP  Disposition: General impression 27-year-old female who presents with acute asthma exacerbation  Treated with heart neb and steroids  Symptoms resolved prior to discharge  Symptomatic care discussed, treatment with steroids and albuterol at home discussed  Follow-up with OB/GYN as well as primary care discussed  All questions answered  Patient agreement with plan  The patient was discharged in stable condition  Patient ambulated off the department  Extensive return to emergency department precautions were discussed  Follow up with appropriate providers including primary care physician was discussed    Patient and/or their  primary decision maker expressed understanding  Patient remained stable during entire emergency department stay  COVID-19: acute illness or injury  Exacerbation of asthma, unspecified asthma severity, unspecified whether persistent: acute illness or injury  Amount and/or Complexity of Data Reviewed  Independent Historian: parent  Labs: ordered  Decision-making details documented in ED Course  Risk  OTC drugs  Prescription drug management  Disposition  Final diagnoses:   Exacerbation of asthma, unspecified asthma severity, unspecified whether persistent   COVID-19     Time reflects when diagnosis was documented in both MDM as applicable and the Disposition within this note     Time User Action Codes Description Comment    1/18/2023 10:06 PM Marine Gomez Add [Y34 [id number]] Exacerbation of asthma, unspecified asthma severity, unspecified whether persistent     1/18/2023 10:06 PM Marine Gomez Add [U07 1] COVID-19       ED Disposition     ED Disposition   Discharge    Condition   Stable    Date/Time   Wed Jan 18, 2023 10:06 PM    Comment   [facility] discharge to home/self care                 Follow-up Information     Follow up With Specialties Details Why [address] [facility] Emergency Department Emergency Medicine Go to  As needed, If symptoms worsen [place] [id number]  [address] [facility] Emergency Department, [address] , [facility] [address]  Call  To schedule an appointment with a primary care physician [phone]       Jose Hirsch DO Family Medicine Schedule an appointment as soon as possible for a visit   [address] [facility]  [phone]             Discharge Medication List as of 1/18/2023 10:11 PM      START taking these medications    Details   albuterol (2 5 mg/3 mL) 0 083 % nebulizer solution Take 3 mL (2 5 mg total) by nebulization every 6 (six) hours as needed for wheezing or shortness of breath, Starting Wed 1/18/2023, Until Tue 4/18/2023 at 2359, Normal      !! predniSONE 20 mg tablet Take 3 tablets (60 mg total) by mouth daily for 4 days, Starting Wed 1/18/2023, Until Sun 1/22/2023, Normal       !! - Potential duplicate medications found  Please discuss with provider  CONTINUE these medications which have CHANGED    Details   albuterol (PROVENTIL HFA,VENTOLIN HFA) 90 mcg/act inhaler Inhale 2 puffs every 4 (four) hours as needed for wheezing, Starting Wed 1/18/2023, Until Thu 1/18/2024 at 2359, Normal         CONTINUE these medications which have NOT CHANGED    Details   benzonatate (TESSALON PERLES) 100 mg capsule Take 1 capsule (100 mg total) by mouth 3 (three) times a day, Starting Mon 11/22/2021, Normal      Doxylamine-Pyridoxine (Diclegis) 10-10 MG TBEC Take 1 tablet by mouth daily at bedtime as needed (nausea), Starting Wed 12/21/2022, Normal      fluticasone (FLOVENT HFA) 110 MCG/ACT inhaler Inhale 2 puffs 2 (two) times a day Rinse mouth after use , Historical Med      fluticasone-vilanterol (Breo Ellipta) 200-25 MCG/INH inhaler Inhale 1 puff daily Rinse mouth after use , Starting Fri 12/10/2021, Until Sun 1/9/2022, Normal      guaiFENesin (ROBITUSSIN) 100 MG/5ML oral liquid Take 10 mL (200 mg total) by mouth every 4 (four) hours as needed for cough, Starting Wed 12/21/2022, Normal      ibuprofen (MOTRIN) 600 mg tablet Take 1 tablet (600 mg total) by mouth every 6 (six) hours as needed for moderate pain, Starting Mon 4/11/2022, Normal      montelukast (SINGULAIR) 10 mg tablet Take 10 mg by mouth daily at bedtime, Historical Med      !! predniSONE 10 mg tablet Take 40 mg x 3 days, then 30 mg x 3 days, then 20 mg x 3 days, then 10 mg x 3 days, Normal      pyridoxine (B-6) 25 MG tablet Take 1 tablet (25 mg total) by mouth 3 (three) times a day as needed (nausea), Starting Mon 12/12/2022, Normal       !! - Potential duplicate medications found  Please discuss with provider  No discharge procedures on file      PDMP Review     None          ED Provider  Electronically Signed by           Anjali Irizarry PA-C  01/19/23 7721

## 2023-02-02 ENCOUNTER — HOSPITAL ENCOUNTER (EMERGENCY)
Facility: HOSPITAL | Age: 23
Discharge: HOME | End: 2023-02-02
Attending: EMERGENCY MEDICINE

## 2023-02-02 VITALS
BODY MASS INDEX: 25.14 KG/M2 | TEMPERATURE: 96.6 F | HEART RATE: 113 BPM | DIASTOLIC BLOOD PRESSURE: 83 MMHG | WEIGHT: 160.5 LBS | SYSTOLIC BLOOD PRESSURE: 115 MMHG | RESPIRATION RATE: 22 BRPM | OXYGEN SATURATION: 100 %

## 2023-02-02 DIAGNOSIS — J45.41 MODERATE PERSISTENT ASTHMA WITH ACUTE EXACERBATION: Primary | ICD-10-CM

## 2023-02-02 RX ORDER — PREDNISONE 10 MG/1
TABLET ORAL
Qty: 27 TABLET | Refills: 0 | Status: SHIPPED | OUTPATIENT
Start: 2023-02-02

## 2023-02-02 RX ORDER — ALBUTEROL SULFATE 2.5 MG/3ML
5 SOLUTION RESPIRATORY (INHALATION) ONCE
Status: COMPLETED | OUTPATIENT
Start: 2023-02-02 | End: 2023-02-02

## 2023-02-02 RX ADMIN — ALBUTEROL SULFATE 5 MG: 2.5 SOLUTION RESPIRATORY (INHALATION) at 14:00

## 2023-02-02 RX ADMIN — PREDNISONE 50 MG: 20 TABLET ORAL at 14:24

## 2023-02-02 RX ADMIN — ALBUTEROL SULFATE 5 MG: 2.5 SOLUTION RESPIRATORY (INHALATION) at 14:27

## 2023-02-02 RX ADMIN — IPRATROPIUM BROMIDE 0.5 MG: 0.5 SOLUTION RESPIRATORY (INHALATION) at 14:00

## 2023-02-02 NOTE — ED PROVIDER NOTES
History  Chief Complaint   Patient presents with   • Asthma     Pt c/o asthma excerebration for the past several days  Pt denies cp/n/v/d or fevers  Pt also c/o headache  27 yo F  at 13w4d, with asthma, for which she says "I wheeze everyday", but denies that she has needed admission since she was a child, never intubated, c/o increased wheezing again today, exacerbated since last night, which was recurring despite using her inhaler followed by breathing treatments  She takes two treatments at at time, but doesn't want to use them too often  She was last treated in the ED 23, and put on a steroid burst         History provided by:  Patient      Prior to Admission Medications   Prescriptions Last Dose Informant Patient Reported? Taking? Doxylamine-Pyridoxine (Diclegis) 10-10 MG TBEC   No No   Sig: Take 1 tablet by mouth daily at bedtime as needed (nausea)   albuterol (2 5 mg/3 mL) 0 083 % nebulizer solution   No No   Sig: Take 3 mL (2 5 mg total) by nebulization every 6 (six) hours as needed for wheezing or shortness of breath   albuterol (PROVENTIL HFA,VENTOLIN HFA) 90 mcg/act inhaler   No No   Sig: Inhale 2 puffs every 4 (four) hours as needed for wheezing   benzonatate (TESSALON PERLES) 100 mg capsule   No No   Sig: Take 1 capsule (100 mg total) by mouth 3 (three) times a day   Patient not taking: Reported on 12/10/2021    fluticasone (FLOVENT HFA) 110 MCG/ACT inhaler   Yes No   Sig: Inhale 2 puffs 2 (two) times a day Rinse mouth after use  fluticasone-vilanterol (Breo Ellipta) 200-25 MCG/INH inhaler   No No   Sig: Inhale 1 puff daily Rinse mouth after use     guaiFENesin (ROBITUSSIN) 100 MG/5ML oral liquid   No No   Sig: Take 10 mL (200 mg total) by mouth every 4 (four) hours as needed for cough   ibuprofen (MOTRIN) 600 mg tablet   No No   Sig: Take 1 tablet (600 mg total) by mouth every 6 (six) hours as needed for moderate pain   montelukast (SINGULAIR) 10 mg tablet   Yes No   Sig: Take 10 mg by mouth daily at bedtime   predniSONE 10 mg tablet   No No   Sig: Take 40 mg x 3 days, then 30 mg x 3 days, then 20 mg x 3 days, then 10 mg x 3 days   Patient not taking: Reported on 12/10/2021    pyridoxine (B-6) 25 MG tablet   No No   Sig: Take 1 tablet (25 mg total) by mouth 3 (three) times a day as needed (nausea)      Facility-Administered Medications: None       Past Medical History:   Diagnosis Date   • Anxiety    • Asthma        History reviewed  No pertinent surgical history  History reviewed  No pertinent family history  I have reviewed and agree with the history as documented  E-Cigarette/Vaping   • E-Cigarette Use Never User      E-Cigarette/Vaping Substances   • Nicotine No    • THC No    • CBD No    • Flavoring No    • Other No    • Unknown No      Social History     Tobacco Use   • Smoking status: Never   • Smokeless tobacco: Never   Vaping Use   • Vaping Use: Never used   Substance Use Topics   • Alcohol use: No   • Drug use: No       Review of Systems   Respiratory: Positive for cough, chest tightness, shortness of breath and wheezing  Genitourinary: Negative for vaginal bleeding  Physical Exam  Physical Exam  Vitals and nursing note reviewed  Constitutional:       Appearance: She is well-developed  She is not toxic-appearing or diaphoretic  HENT:      Head: Normocephalic and atraumatic  Right Ear: Tympanic membrane and external ear normal       Left Ear: Tympanic membrane and external ear normal       Nose: Nose normal    Eyes:      Conjunctiva/sclera: Conjunctivae normal       Pupils: Pupils are equal, round, and reactive to light  Neck:      Meningeal: Brudzinski's sign and Kernig's sign absent  Cardiovascular:      Rate and Rhythm: Normal rate and regular rhythm  Pulses: Normal pulses  Heart sounds: Normal heart sounds  No murmur heard  Pulmonary:      Effort: Pulmonary effort is normal  Tachypnea present  No respiratory distress  Breath sounds:  No decreased air movement  Wheezing and rhonchi present  Abdominal:      General: Bowel sounds are normal  There is distension  Palpations: Abdomen is soft  Abdomen is not rigid  Tenderness: There is no abdominal tenderness  There is no guarding or rebound  Musculoskeletal:         General: Normal range of motion  Cervical back: Full passive range of motion without pain, normal range of motion and neck supple  Right lower leg: No swelling  Left lower leg: No swelling  Lymphadenopathy:      Cervical: No cervical adenopathy  Skin:     General: Skin is warm and dry  Capillary Refill: Capillary refill takes less than 2 seconds  Coloration: Skin is not pale  Findings: No rash  Neurological:      Mental Status: She is alert and oriented to person, place, and time  GCS: GCS eye subscore is 4  GCS verbal subscore is 5  GCS motor subscore is 6  Cranial Nerves: No cranial nerve deficit  Sensory: No sensory deficit  Coordination: Coordination normal       Gait: Gait normal       Deep Tendon Reflexes: Reflexes are normal and symmetric  Psychiatric:         Speech: Speech normal          Behavior: Behavior normal          Thought Content:  Thought content normal          Judgment: Judgment normal          Vital Signs  ED Triage Vitals [02/02/23 1350]   Temperature Pulse Respirations Blood Pressure SpO2   (!) 96 6 °F (35 9 °C) (!) 113 22 115/83 100 %      Temp Source Heart Rate Source Patient Position - Orthostatic VS BP Location FiO2 (%)   Oral Monitor Sitting Right arm --      Pain Score       7           Vitals:    02/02/23 1350   BP: 115/83   Pulse: (!) 113   Patient Position - Orthostatic VS: Sitting         Visual Acuity      ED Medications  Medications   albuterol inhalation solution 5 mg (5 mg Nebulization Given 2/2/23 1400)     And   ipratropium (ATROVENT) 0 02 % inhalation solution 0 5 mg (0 5 mg Nebulization Given 2/2/23 1400)   albuterol inhalation solution 5 mg (5 mg Nebulization Given 2/2/23 1427)   predniSONE tablet 50 mg (50 mg Oral Given 2/2/23 1424)       Diagnostic Studies  Results Reviewed     None                 No orders to display              Procedures  Procedures         ED Course  ED Course as of 02/02/23 1554   Thu Feb 02, 2023   1518 Reviewed results with patient at bedside and updated on the plan  She is enjoying a ham sandwich and talking on the phone in no distress  Breath sounds are equal and minimal wheezing  Since she was on a burst of prednisone, recently, I will do a taper this time  MDM    Disposition  Final diagnoses: Moderate persistent asthma with acute exacerbation     Time reflects when diagnosis was documented in both MDM as applicable and the Disposition within this note     Time User Action Codes Description Comment    2/2/2023  3:21 PM Ariel Poor Add [J45 41] Moderate persistent asthma with acute exacerbation       ED Disposition     ED Disposition   Discharge    Condition   Good    Date/Time   Thu Feb 2, 2023  3:20 PM    Comment   Yoko Caldwell discharge to home/self care  Follow-up Information     Follow up With Specialties Details Why Contact Info Additional [address] [facility] Pulmonology Call  For followup [phone] [facility] [address] [id number]  [address] [facility], [address] [place] [address] Laila Bateman, [facility], [place], [address] [phone]    [facility]  Go to  For followup [facility] - [facility]   [address] [facility], [facility] [place] [id number] [phone]           Discharge Medication List as of 2/2/2023  3:23 PM      CONTINUE these medications which have CHANGED    Details   predniSONE 10 mg tablet START 4 po daily on days 1-5  Then 3 po on Day 6   2 po on Day 7   1 po on Day 8   1/2 po on Day 9 and 10    STOP, Normal CONTINUE these medications which have NOT CHANGED    Details   albuterol (2 5 mg/3 mL) 0 083 % nebulizer solution Take 3 mL (2 5 mg total) by nebulization every 6 (six) hours as needed for wheezing or shortness of breath, Starting Wed 1/18/2023, Until Tue 4/18/2023 at 2359, Normal      albuterol (PROVENTIL HFA,VENTOLIN HFA) 90 mcg/act inhaler Inhale 2 puffs every 4 (four) hours as needed for wheezing, Starting Wed 1/18/2023, Until Thu 1/18/2024 at 2359, Normal      benzonatate (TESSALON PERLES) 100 mg capsule Take 1 capsule (100 mg total) by mouth 3 (three) times a day, Starting Mon 11/22/2021, Normal      Doxylamine-Pyridoxine (Diclegis) 10-10 MG TBEC Take 1 tablet by mouth daily at bedtime as needed (nausea), Starting Wed 12/21/2022, Normal      fluticasone (FLOVENT HFA) 110 MCG/ACT inhaler Inhale 2 puffs 2 (two) times a day Rinse mouth after use , Historical Med      fluticasone-vilanterol (Breo Ellipta) 200-25 MCG/INH inhaler Inhale 1 puff daily Rinse mouth after use , Starting Fri 12/10/2021, Until Sun 1/9/2022, Normal      guaiFENesin (ROBITUSSIN) 100 MG/5ML oral liquid Take 10 mL (200 mg total) by mouth every 4 (four) hours as needed for cough, Starting Wed 12/21/2022, Normal      montelukast (SINGULAIR) 10 mg tablet Take 10 mg by mouth daily at bedtime, Historical Med      pyridoxine (B-6) 25 MG tablet Take 1 tablet (25 mg total) by mouth 3 (three) times a day as needed (nausea), Starting Mon 12/12/2022, Normal         STOP taking these medications       ibuprofen (MOTRIN) 600 mg tablet Comments:   Reason for Stopping:                   PDMP Review     None          ED Provider  Electronically Signed by           Deborah Meadows MD  02/02/23 1845

## 2023-02-02 NOTE — Clinical Note
Karli Payton was seen and treated in our emergency department on 2/2/2023  Diagnosis:     Chao Vance  may return to work on return date  She may return on this date: 02/03/2023         If you have any questions or concerns, please don't hesitate to call        Swathi De La Cruz RN    ______________________________           _______________          _______________  Hospital Representative                              Date                                Time

## 2023-02-20 ENCOUNTER — OFFICE VISIT (OUTPATIENT)
Dept: PULMONOLOGY | Facility: CLINIC | Age: 23
End: 2023-02-20

## 2023-02-20 VITALS
HEIGHT: 67 IN | OXYGEN SATURATION: 98 % | SYSTOLIC BLOOD PRESSURE: 110 MMHG | WEIGHT: 162 LBS | BODY MASS INDEX: 25.43 KG/M2 | TEMPERATURE: 98.5 F | DIASTOLIC BLOOD PRESSURE: 66 MMHG | HEART RATE: 94 BPM

## 2023-02-20 DIAGNOSIS — U07.1 COVID-19: ICD-10-CM

## 2023-02-20 DIAGNOSIS — J45.40 MODERATE PERSISTENT ASTHMA WITHOUT COMPLICATION: Primary | ICD-10-CM

## 2023-02-20 DIAGNOSIS — J45.41 MODERATE PERSISTENT ASTHMA WITH ACUTE EXACERBATION: ICD-10-CM

## 2023-02-20 DIAGNOSIS — J45.901 EXACERBATION OF ASTHMA, UNSPECIFIED ASTHMA SEVERITY, UNSPECIFIED WHETHER PERSISTENT: ICD-10-CM

## 2023-02-20 RX ORDER — MAGNESIUM OXIDE 400 MG/1
400 TABLET ORAL DAILY
COMMUNITY

## 2023-02-20 RX ORDER — ALBUTEROL SULFATE 90 UG/1
2 AEROSOL, METERED RESPIRATORY (INHALATION) EVERY 6 HOURS PRN
Qty: 8.5 G | Refills: 3 | Status: SHIPPED | OUTPATIENT
Start: 2023-02-20 | End: 2023-03-10

## 2023-02-20 RX ORDER — ALBUTEROL SULFATE 2.5 MG/3ML
2.5 SOLUTION RESPIRATORY (INHALATION) EVERY 6 HOURS PRN
Qty: 75 ML | Refills: 3 | Status: SHIPPED | OUTPATIENT
Start: 2023-02-20 | End: 2023-05-21

## 2023-02-20 RX ORDER — BUDESONIDE 90 UG/1
1 AEROSOL, POWDER RESPIRATORY (INHALATION) 2 TIMES DAILY
Qty: 1 EACH | Refills: 3 | Status: SHIPPED | OUTPATIENT
Start: 2023-02-20

## 2023-02-20 NOTE — PROGRESS NOTES
Pulmonary Follow Up Note   Ericka Ko 26 y o  female MRN: [id number]  2/20/2023      Assessment:    Moderate persistent asthma without complication  Patient is a 82-year-old woman with a history of moderate persistent asthma, currently 15 weeks pregnant, who presents to the pulmonary office for further evaluation due to her asthma being out of control  This is likely secondary to her not being on a maintenance medication in addition to the underlying pregnancy  One third of patients with pregnancy are expected to have worsening of their symptoms  She reports continuation of shortness of breath wheezing and chest tightness  Those symptoms have resolved after recent steroid taper  Plan  Given samples of ipratropium/albuterol nebs today  I have asked her to contact her insurance company to get her insurance information prior to the card arriving so that she can go to the pharmacy and  her medications  If she runs into difficulty I have advised her to contact our clinic and her primary care physician to see if there or other forms of assistance  Reviewed pregnancy and asthma with the patient today  Will  prescribe Pulmicort (budesonide) given its safety profile in pregnancy  If this is ineffective then would consider alternative ICS LABA as the risk of uncontrolled asthma likely outweighs the risk  the medications in pregnancy  Continue albuterol as needed    She will follow-up in approximately 8 to 10 weeks for further review      Plan:    Diagnoses and all orders for this visit:    Moderate persistent asthma without complication    Moderate persistent asthma with acute exacerbation  -     Ambulatory Referral to Pulmonology  -     budesonide (Pulmicort Flexhaler) 90 MCG/ACT inhaler; Inhale 1 puff 2 (two) times a day Rinse mouth after use  -     albuterol (ProAir HFA) 90 mcg/act inhaler;  Inhale 2 puffs every 6 (six) hours as needed for wheezing for up to 18 days    Exacerbation of asthma, unspecified asthma severity, unspecified whether persistent  -     albuterol (2 5 mg/3 mL) 0 083 % nebulizer solution; Take 3 mL (2 5 mg total) by nebulization every 6 (six) hours as needed for wheezing or shortness of breath    COVID-19    Other orders  -     magnesium oxide (MAG-OX) 400 mg tablet; Take 400 mg by mouth daily  -     PRENATAL VIT-DOCUSATE-IRON-FA PO; Take by mouth        No follow-ups on file  History of Present Illness   HPI:  Lavern Boo is a 26 y o  female who is currently pregnant  14 weeks with a history of asthma who was referred to the pulmonary office for further evaluation  She was seen in the emergency room on  with an asthma exacerbation  She received bronchodilator therapy  She was prescribed a steroid burst     Was last seen in the pulmonary office in 2021  At that time she was given samples of Breo Ellipta  She continues to have sob, chest tightness, wheezing, coughing  She was unsure if she could use her asthma medications due to her pregnancy  She has experienced nocturnal wheezing  She recently completed a course prednisone  States that she is still waiting an insurance card to come in the mail and has had difficulty affording medications  No systemic complaints today  Review of Systems   Constitutional: Negative for chills, fatigue and fever  HENT: Negative for congestion, nosebleeds, postnasal drip, rhinorrhea, sinus pressure and sore throat  Eyes: Negative for discharge, redness and itching  Respiratory: Positive for chest tightness, shortness of breath and wheezing  Negative for cough, choking and stridor  Cardiovascular: Negative for chest pain, palpitations and leg swelling  Gastrointestinal: Negative for blood in stool  Genitourinary: Negative for difficulty urinating and dysuria  Musculoskeletal: Negative for arthralgias, joint swelling and myalgias  Skin: Negative for color change and rash     Neurological: Negative for light-headedness and headaches  Hematological: Negative for adenopathy  Historical Information   Past Medical History:   Diagnosis Date   • Anxiety    • Asthma      No past surgical history on file  No family history on file  Meds/Allergies     Current Outpatient Medications:   •  albuterol (2 5 mg/3 mL) 0 083 % nebulizer solution, Take 3 mL (2 5 mg total) by nebulization every 6 (six) hours as needed for wheezing or shortness of breath, Disp: 75 mL, Rfl: 3  •  albuterol (ProAir HFA) 90 mcg/act inhaler, Inhale 2 puffs every 6 (six) hours as needed for wheezing for up to 18 days, Disp: 8 5 g, Rfl: 3  •  albuterol (PROVENTIL HFA,VENTOLIN HFA) 90 mcg/act inhaler, Inhale 2 puffs every 4 (four) hours as needed for wheezing, Disp: 18 g, Rfl: 0  •  budesonide (Pulmicort Flexhaler) 90 MCG/ACT inhaler, Inhale 1 puff 2 (two) times a day Rinse mouth after use , Disp: 1 each, Rfl: 3  •  magnesium oxide (MAG-OX) 400 mg tablet, Take 400 mg by mouth daily, Disp: , Rfl:   •  PRENATAL VIT-DOCUSATE-IRON-FA PO, Take by mouth, Disp: , Rfl:   •  benzonatate (TESSALON PERLES) 100 mg capsule, Take 1 capsule (100 mg total) by mouth 3 (three) times a day (Patient not taking: Reported on 12/10/2021), Disp: 30 capsule, Rfl: 0  •  Doxylamine-Pyridoxine (Diclegis) 10-10 MG TBEC, Take 1 tablet by mouth daily at bedtime as needed (nausea) (Patient not taking: Reported on 2/20/2023), Disp: 7 tablet, Rfl: 0  •  fluticasone (FLOVENT HFA) 110 MCG/ACT inhaler, Inhale 2 puffs 2 (two) times a day Rinse mouth after use  (Patient not taking: Reported on 2/20/2023), Disp: , Rfl:   •  fluticasone-vilanterol (Breo Ellipta) 200-25 MCG/INH inhaler, Inhale 1 puff daily Rinse mouth after use   (Patient not taking: Reported on 2/20/2023), Disp: 60 blister, Rfl: 3  •  guaiFENesin (ROBITUSSIN) 100 MG/5ML oral liquid, Take 10 mL (200 mg total) by mouth every 4 (four) hours as needed for cough (Patient not taking: Reported on 2/20/2023), Disp: 60 mL, Rfl: 0  •  montelukast (SINGULAIR) 10 mg tablet, Take 10 mg by mouth daily at bedtime (Patient not taking: Reported on 2/20/2023), Disp: , Rfl:   •  predniSONE 10 mg tablet, START 4 po daily on days 1-5  Then 3 po on Day 6   2 po on Day 7   1 po on Day 8   1/2 po on Day 9 and 10  STOP (Patient not taking: Reported on 2/20/2023), Disp: 27 tablet, Rfl: 0  •  pyridoxine (B-6) 25 MG tablet, Take 1 tablet (25 mg total) by mouth 3 (three) times a day as needed (nausea) (Patient not taking: Reported on 2/20/2023), Disp: 30 tablet, Rfl: 0  Allergies   Allergen Reactions   • Tomato (Diagnostic) - Food Allergy Angioedema     Patient verbalized    • Citrus - Food Allergy Cough and Throat Swelling     Patient verbalized    • Nuts - Food Allergy Throat Swelling     Patient verbalized    • Other Wheezing     cats       Vitals: Blood pressure 110/66, pulse 94, temperature 98 5 °F (36 9 °C), temperature source Tympanic, height 5' 7" (1 702 m), weight 73 5 kg (162 lb), last menstrual period 11/01/2022, SpO2 98 %  Body mass index is 25 37 kg/m²  Oxygen Therapy  SpO2: 98 %  Oxygen Therapy: None (Room air)      Physical Exam  Physical Exam  Vitals reviewed  Constitutional:       General: She is not in acute distress  Appearance: She is well-developed  She is not ill-appearing, toxic-appearing or diaphoretic  HENT:      Head: Normocephalic and atraumatic  Right Ear: External ear normal       Left Ear: External ear normal       Nose: Nose normal  No congestion or rhinorrhea  Mouth/Throat:      Pharynx: No oropharyngeal exudate or posterior oropharyngeal erythema  Eyes:      General: No scleral icterus  Right eye: No discharge  Left eye: No discharge  Conjunctiva/sclera: Conjunctivae normal       Pupils: Pupils are equal, round, and reactive to light  Neck:      Trachea: No tracheal deviation  Cardiovascular:      Rate and Rhythm: Normal rate and regular rhythm        Heart sounds: Normal heart sounds  No murmur heard  No friction rub  No gallop  Pulmonary:      Effort: Pulmonary effort is normal       Breath sounds: Normal breath sounds  No stridor  No wheezing or rales  Musculoskeletal:      Cervical back: Normal range of motion  Right lower leg: No edema  Left lower leg: No edema  Lymphadenopathy:      Head:      Right side of head: No submental, submandibular, preauricular or posterior auricular adenopathy  Left side of head: No submental, submandibular, preauricular or posterior auricular adenopathy  Cervical: No cervical adenopathy  Skin:     General: Skin is warm and dry  Findings: No lesion or rash  Neurological:      Mental Status: She is alert and oriented to person, place, and time  Labs: I have personally reviewed pertinent lab results  Lab Results   Component Value Date    WBC 15 80 (H) 01/18/2023    HGB 12 3 01/18/2023    HCT 36 3 01/18/2023    MCV 94 01/18/2023     01/18/2023     Lab Results   Component Value Date    CALCIUM 9 1 01/18/2023    K 4 0 01/18/2023    CO2 24 01/18/2023     01/18/2023    BUN 11 01/18/2023    CREATININE 0 79 01/18/2023     No results found for: IGE  Lab Results   Component Value Date    ALT 40 12/21/2022    AST 23 12/21/2022    ALKPHOS 57 12/21/2022       Imaging and other studies: I have personally reviewed pertinent reports  and I have personally reviewed pertinent films in PACS     CXR 1/2021     FINDINGS:     Cardiomediastinal silhouette appears unremarkable      The lungs are clear  No pneumothorax or pleural effusion      Osseous structures appear within normal limits for patient age      IMPRESSION:     No acute cardiopulmonary disease        Pulmonary function testing:   None on file       MICK Babin [facility]

## 2023-02-20 NOTE — ASSESSMENT & PLAN NOTE
Patient is a 77-year-old woman with a history of moderate persistent asthma, currently 15 weeks pregnant, who presents to the pulmonary office for further evaluation due to her asthma being out of control  This is likely secondary to her not being on a maintenance medication in addition to the underlying pregnancy  One third of patients with pregnancy are expected to have worsening of their symptoms  She reports continuation of shortness of breath wheezing and chest tightness  Those symptoms have resolved after recent steroid taper  Plan  Given samples of ipratropium/albuterol nebs today  I have asked her to contact her insurance company to get her insurance information prior to the card arriving so that she can go to the pharmacy and  her medications  If she runs into difficulty I have advised her to contact our clinic and her primary care physician to see if there or other forms of assistance  Reviewed pregnancy and asthma with the patient today  Will  prescribe Pulmicort (budesonide) given its safety profile in pregnancy  If this is ineffective then would consider alternative ICS LABA as the risk of uncontrolled asthma likely outweighs the risk  the medications in pregnancy      Continue albuterol as needed    She will follow-up in approximately 8 to 10 weeks for further review

## 2023-05-17 ENCOUNTER — TELEPHONE (OUTPATIENT)
Dept: OBGYN CLINIC | Facility: CLINIC | Age: 23
End: 2023-05-17

## 2023-05-17 NOTE — TELEPHONE ENCOUNTER
Karishma Alatorre came in seeking interest in becoming a pt but after reviewing everything she decided to go somewhere else

## 2023-09-07 ENCOUNTER — OFFICE VISIT (OUTPATIENT)
Dept: URGENT CARE | Facility: MEDICAL CENTER | Age: 23
End: 2023-09-07
Payer: COMMERCIAL

## 2023-09-07 VITALS
BODY MASS INDEX: 25.37 KG/M2 | WEIGHT: 162 LBS | TEMPERATURE: 97.8 F | HEART RATE: 87 BPM | RESPIRATION RATE: 20 BRPM | OXYGEN SATURATION: 98 %

## 2023-09-07 DIAGNOSIS — S02.5XXA CLOSED FRACTURE OF TOOTH, INITIAL ENCOUNTER: ICD-10-CM

## 2023-09-07 DIAGNOSIS — K04.7 DENTAL INFECTION: Primary | ICD-10-CM

## 2023-09-07 PROCEDURE — 99212 OFFICE O/P EST SF 10 MIN: CPT | Performed by: PHYSICIAN ASSISTANT

## 2023-09-07 PROCEDURE — S9088 SERVICES PROVIDED IN URGENT: HCPCS | Performed by: PHYSICIAN ASSISTANT

## 2023-09-07 RX ORDER — AMOXICILLIN 500 MG/1
500 CAPSULE ORAL EVERY 8 HOURS SCHEDULED
Qty: 21 CAPSULE | Refills: 0 | Status: SHIPPED | OUTPATIENT
Start: 2023-09-07 | End: 2023-09-14

## 2023-09-07 NOTE — PROGRESS NOTES
[facility]        NAME: Marga Cogan is a 26 y.o. female  : 2000    MRN: [id number]  DATE: 2023  TIME: 11:26 AM    Assessment and Plan   Dental infection [K04.7]  1. Dental infection  amoxicillin (AMOXIL) 500 mg capsule      2. Closed fracture of tooth, initial encounter              Patient Instructions       Follow up with PCP in 3-5 days. Proceed to  ER if symptoms worsen. Chief Complaint     Chief Complaint   Patient presents with   • Dental Pain     Right lower dental pain, took cracked yesterday, tooth is sharp and painful, trouble eating , numb to area, also has a problem tooth to left lower jaw, not self treating, pt. Does not have a dentist         History of Present Illness       Patient's right lower last molar broke and is sharp, causing tongue pain with eating and speaking. Also has several cavities in the left upper tooth are starting to bother her. Does not have an upcoming dental appointment. Review of Systems   Review of Systems   Constitutional: Negative for fever. HENT: Positive for dental problem. Negative for trouble swallowing.           Current Medications       Current Outpatient Medications:   •  amoxicillin (AMOXIL) 500 mg capsule, Take 1 capsule (500 mg total) by mouth every 8 (eight) hours for 7 days, Disp: 21 capsule, Rfl: 0  •  albuterol (PROVENTIL HFA,VENTOLIN HFA) 90 mcg/act inhaler, Inhale 2 puffs every 4 (four) hours as needed for wheezing, Disp: 18 g, Rfl: 0  •  benzonatate (TESSALON PERLES) 100 mg capsule, Take 1 capsule (100 mg total) by mouth 3 (three) times a day (Patient not taking: Reported on 12/10/2021), Disp: 30 capsule, Rfl: 0  •  budesonide (Pulmicort Flexhaler) 90 MCG/ACT inhaler, Inhale 1 puff 2 (two) times a day Rinse mouth after use., Disp: 1 each, Rfl: 3  •  Doxylamine-Pyridoxine (Diclegis) 10-10 MG TBEC, Take 1 tablet by mouth daily at bedtime as needed (nausea) (Patient not taking: Reported on 2023), Disp: 7 tablet, Rfl: 0  •  fluticasone (FLOVENT HFA) 110 MCG/ACT inhaler, Inhale 2 puffs 2 (two) times a day Rinse mouth after use. (Patient not taking: Reported on 2/20/2023), Disp: , Rfl:   •  fluticasone-vilanterol (Breo Ellipta) 200-25 MCG/INH inhaler, Inhale 1 puff daily Rinse mouth after use. (Patient not taking: Reported on 2/20/2023), Disp: 60 blister, Rfl: 3  •  guaiFENesin (ROBITUSSIN) 100 MG/5ML oral liquid, Take 10 mL (200 mg total) by mouth every 4 (four) hours as needed for cough (Patient not taking: Reported on 2/20/2023), Disp: 60 mL, Rfl: 0  •  magnesium oxide (MAG-OX) 400 mg tablet, Take 400 mg by mouth daily, Disp: , Rfl:   •  montelukast (SINGULAIR) 10 mg tablet, Take 10 mg by mouth daily at bedtime (Patient not taking: Reported on 2/20/2023), Disp: , Rfl:   •  predniSONE 10 mg tablet, START 4 po daily on days 1-5. Then 3 po on Day 6.  2 po on Day 7.  1 po on Day 8.  1/2 po on Day 9 and 10. STOP (Patient not taking: Reported on 2/20/2023), Disp: 27 tablet, Rfl: 0  •  PRENATAL VIT-DOCUSATE-IRON-FA PO, Take by mouth, Disp: , Rfl:   •  pyridoxine (B-6) 25 MG tablet, Take 1 tablet (25 mg total) by mouth 3 (three) times a day as needed (nausea) (Patient not taking: Reported on 2/20/2023), Disp: 30 tablet, Rfl: 0    Current Allergies     Allergies as of 09/07/2023 - Reviewed 09/07/2023   Allergen Reaction Noted   • Tomato (diagnostic) - food allergy Angioedema 11/19/2021   • Citrus - food allergy Cough and Throat Swelling 11/19/2021   • Nuts - food allergy Throat Swelling 11/19/2021   • Other Wheezing 12/21/2022            The following portions of the patient's history were reviewed and updated as appropriate: allergies, current medications, past family history, past medical history, past social history, past surgical history and problem list.     Past Medical History:   Diagnosis Date   • Anxiety    • Asthma        History reviewed. No pertinent surgical history. History reviewed.  No pertinent family history. Medications have been verified. Objective   Pulse 87   Temp 97.8 °F (36.6 °C)   Resp 20   Wt 73.5 kg (162 lb)   LMP  (LMP Unknown)   SpO2 98%   Breastfeeding Yes   BMI 25.37 kg/m²   No LMP recorded (lmp unknown). Physical Exam     Physical Exam  Vitals and nursing note reviewed. Constitutional:       Appearance: Normal appearance. HENT:      Head: Normocephalic and atraumatic. Mouth/Throat:     Neurological:      Mental Status: She is alert.

## 2023-09-08 ENCOUNTER — OFFICE VISIT (OUTPATIENT)
Dept: DENTISTRY | Facility: CLINIC | Age: 23
End: 2023-09-08
Payer: COMMERCIAL

## 2023-09-08 DIAGNOSIS — K02.9 DENTAL CARIES: Primary | ICD-10-CM

## 2023-09-08 PROCEDURE — D0220 INTRAORAL - PERIAPICAL FIRST RADIOGRAPHIC IMAGE: HCPCS | Performed by: STUDENT IN AN ORGANIZED HEALTH CARE EDUCATION/TRAINING PROGRAM

## 2023-09-08 PROCEDURE — D0274 BITEWINGS - 4 RADIOGRAPHIC IMAGES: HCPCS | Performed by: STUDENT IN AN ORGANIZED HEALTH CARE EDUCATION/TRAINING PROGRAM

## 2023-09-08 PROCEDURE — D0230 INTRAORAL - PERIAPICAL EACH ADDITIONAL RADIOGRAPHIC IMAGE: HCPCS | Performed by: STUDENT IN AN ORGANIZED HEALTH CARE EDUCATION/TRAINING PROGRAM

## 2023-09-08 PROCEDURE — D0140 LIMITED ORAL EVALUATION - PROBLEM FOCUSED: HCPCS | Performed by: STUDENT IN AN ORGANIZED HEALTH CARE EDUCATION/TRAINING PROGRAM

## 2023-09-08 NOTE — PROGRESS NOTES
Pt presents for limited exam  PMH reviewed, no changes    CC: Pian on the upper left and lower right   Extraoral exam: no extraoral swelling , no intraoral swelling  Intraoral Exam: No extraoral swelling, Gross decay generall    Xrays Taken: 4 BWs 2 PA (#30 and #15)     Pt Referred to OS For ext of #3, 4, 12, 15, 18, 19, 20, 28, 29, 31    NV: Comp exam

## 2024-01-10 ENCOUNTER — OFFICE VISIT (OUTPATIENT)
Dept: URGENT CARE | Facility: MEDICAL CENTER | Age: 24
End: 2024-01-10
Payer: COMMERCIAL

## 2024-01-10 VITALS
TEMPERATURE: 97.9 F | OXYGEN SATURATION: 97 % | RESPIRATION RATE: 18 BRPM | DIASTOLIC BLOOD PRESSURE: 82 MMHG | BODY MASS INDEX: 25.84 KG/M2 | SYSTOLIC BLOOD PRESSURE: 130 MMHG | WEIGHT: 165 LBS | HEART RATE: 78 BPM

## 2024-01-10 DIAGNOSIS — J45.40 MODERATE PERSISTENT ASTHMA WITHOUT COMPLICATION: Primary | ICD-10-CM

## 2024-01-10 PROCEDURE — S9088 SERVICES PROVIDED IN URGENT: HCPCS | Performed by: STUDENT IN AN ORGANIZED HEALTH CARE EDUCATION/TRAINING PROGRAM

## 2024-01-10 PROCEDURE — 99213 OFFICE O/P EST LOW 20 MIN: CPT | Performed by: STUDENT IN AN ORGANIZED HEALTH CARE EDUCATION/TRAINING PROGRAM

## 2024-01-10 RX ORDER — ALBUTEROL SULFATE 90 UG/1
2 AEROSOL, METERED RESPIRATORY (INHALATION) EVERY 4 HOURS PRN
Qty: 18 G | Refills: 0 | Status: SHIPPED | OUTPATIENT
Start: 2024-01-10 | End: 2025-01-09

## 2024-01-10 NOTE — PROGRESS NOTES
[facility]        NAME: Yoko Caldwell is a 23 y.o. female  : 2000    MRN: [id number]    Assessment and Plan   Moderate persistent asthma without complication [J45.40]  1. Moderate persistent asthma without complication  albuterol (PROVENTIL HFA,VENTOLIN HFA) 90 mcg/act inhaler        No exacerbation noted on exam as vitals normal, saturating well on room air, exam normal. Will refill one time albuterol.     Patient Instructions     See wrap up for details  Follow up with PCP in 3-5 days.  Proceed to  ER if symptoms worsen.    Chief Complaint     Chief Complaint   Patient presents with    Asthma    Shortness of Breath    Cough    Wheezing         History of Present Illness       HPI    Reports dry cough, wheezing this morning   Reports she misplaced her albuterol inhaler and typically gets refills from [place] but has not been able to go there as she is with famly in the area so requesting one time refill   Compliant with maintenance inhaler, follows with pulmonologist     Review of Systems   Review of Systems   Constitutional:  Negative for chills and fever.   HENT:  Negative for ear pain and sore throat.    Eyes:  Negative for pain and visual disturbance.   Respiratory:  Positive for cough and wheezing. Negative for chest tightness and shortness of breath.    Cardiovascular:  Negative for chest pain and palpitations.   Gastrointestinal:  Negative for abdominal pain, constipation, diarrhea, nausea and vomiting.   Genitourinary:  Negative for dysuria, hematuria and menstrual problem.   Musculoskeletal:  Negative for arthralgias and back pain.   Skin:  Negative for color change and rash.   Neurological:  Negative for seizures and syncope.   Psychiatric/Behavioral:  Negative for dysphoric mood and suicidal ideas.    All other systems reviewed and are negative.    Current Medications       Current Outpatient Medications:     albuterol (PROVENTIL HFA,VENTOLIN HFA) 90 mcg/act inhaler, Inhale 2 puffs  every 4 (four) hours as needed for wheezing, Disp: 18 g, Rfl: 0    benzonatate (TESSALON PERLES) 100 mg capsule, Take 1 capsule (100 mg total) by mouth 3 (three) times a day (Patient not taking: Reported on 12/10/2021), Disp: 30 capsule, Rfl: 0    budesonide (Pulmicort Flexhaler) 90 MCG/ACT inhaler, Inhale 1 puff 2 (two) times a day Rinse mouth after use., Disp: 1 each, Rfl: 3    Doxylamine-Pyridoxine (Diclegis) 10-10 MG TBEC, Take 1 tablet by mouth daily at bedtime as needed (nausea) (Patient not taking: Reported on 2/20/2023), Disp: 7 tablet, Rfl: 0    fluticasone (FLOVENT HFA) 110 MCG/ACT inhaler, Inhale 2 puffs 2 (two) times a day Rinse mouth after use. (Patient not taking: Reported on 2/20/2023), Disp: , Rfl:     fluticasone-vilanterol (Breo Ellipta) 200-25 MCG/INH inhaler, Inhale 1 puff daily Rinse mouth after use. (Patient not taking: Reported on 2/20/2023), Disp: 60 blister, Rfl: 3    guaiFENesin (ROBITUSSIN) 100 MG/5ML oral liquid, Take 10 mL (200 mg total) by mouth every 4 (four) hours as needed for cough (Patient not taking: Reported on 2/20/2023), Disp: 60 mL, Rfl: 0    magnesium oxide (MAG-OX) 400 mg tablet, Take 400 mg by mouth daily, Disp: , Rfl:     montelukast (SINGULAIR) 10 mg tablet, Take 10 mg by mouth daily at bedtime (Patient not taking: Reported on 2/20/2023), Disp: , Rfl:     predniSONE 10 mg tablet, START 4 po daily on days 1-5.  Then 3 po on Day 6.  2 po on Day 7.  1 po on Day 8.  1/2 po on Day 9 and 10.  STOP (Patient not taking: Reported on 2/20/2023), Disp: 27 tablet, Rfl: 0    PRENATAL VIT-DOCUSATE-IRON-FA PO, Take by mouth, Disp: , Rfl:     pyridoxine (B-6) 25 MG tablet, Take 1 tablet (25 mg total) by mouth 3 (three) times a day as needed (nausea) (Patient not taking: Reported on 2/20/2023), Disp: 30 tablet, Rfl: 0    Current Allergies     Allergies as of 01/10/2024 - Reviewed 01/10/2024   Allergen Reaction Noted    Tomato (diagnostic) - food allergy Angioedema 11/19/2021    Citrus -  food allergy Cough and Throat Swelling 11/19/2021    Nuts - food allergy Throat Swelling 11/19/2021    Other Wheezing 12/21/2022            The following portions of the patient's history were reviewed and updated as appropriate: allergies, current medications, past family history, past medical history, past social history, past surgical history and problem list.     Past Medical History:   Diagnosis Date    Anxiety     Asthma        History reviewed. No pertinent surgical history.    History reviewed. No pertinent family history.      Medications have been verified.        Objective   /82   Pulse 78   Temp 97.9 °F (36.6 °C)   Resp 18   Wt 74.8 kg (165 lb)   SpO2 97%   BMI 25.84 kg/m²        Physical Exam     Physical Exam  Constitutional:       General: She is not in acute distress.     Appearance: Normal appearance.   HENT:      Head: Normocephalic and atraumatic.   Eyes:      Extraocular Movements: Extraocular movements intact.      Conjunctiva/sclera: Conjunctivae normal.   Cardiovascular:      Rate and Rhythm: Normal rate.   Pulmonary:      Effort: Pulmonary effort is normal. No respiratory distress.      Breath sounds: Normal breath sounds. No decreased breath sounds, wheezing, rhonchi or rales.      Comments: Not in respiratory distress, no conversational dyspnea  Skin:     General: Skin is warm and dry.   Neurological:      General: No focal deficit present.      Mental Status: She is alert and oriented to person, place, and time.   Psychiatric:         Mood and Affect: Mood normal.         Behavior: Behavior normal.

## 2024-04-01 ENCOUNTER — HOSPITAL ENCOUNTER (EMERGENCY)
Facility: HOSPITAL | Age: 24
Discharge: HOME | End: 2024-04-01
Attending: INTERNAL MEDICINE
Payer: COMMERCIAL

## 2024-04-01 VITALS
DIASTOLIC BLOOD PRESSURE: 66 MMHG | TEMPERATURE: 98.9 F | HEART RATE: 72 BPM | RESPIRATION RATE: 16 BRPM | OXYGEN SATURATION: 97 % | WEIGHT: 160.72 LBS | BODY MASS INDEX: 25.17 KG/M2 | SYSTOLIC BLOOD PRESSURE: 125 MMHG

## 2024-04-01 DIAGNOSIS — K13.0 LIP LESION: ICD-10-CM

## 2024-04-01 DIAGNOSIS — R22.0 SWELLING OF UPPER LIP: Primary | ICD-10-CM

## 2024-04-01 PROCEDURE — 99282 EMERGENCY DEPT VISIT SF MDM: CPT

## 2024-04-01 PROCEDURE — 99284 EMERGENCY DEPT VISIT MOD MDM: CPT

## 2024-04-01 RX ORDER — DIPHENHYDRAMINE HCL 25 MG
25 TABLET ORAL ONCE
Status: COMPLETED | OUTPATIENT
Start: 2024-04-01 | End: 2024-04-01

## 2024-04-01 RX ORDER — FAMOTIDINE 20 MG/1
20 TABLET, FILM COATED ORAL 2 TIMES DAILY
Qty: 30 TABLET | Refills: 0 | Status: SHIPPED | OUTPATIENT
Start: 2024-04-01 | End: 2024-04-01

## 2024-04-01 RX ORDER — PREDNISONE 20 MG/1
40 TABLET ORAL DAILY
Qty: 10 TABLET | Refills: 0 | Status: SHIPPED | OUTPATIENT
Start: 2024-04-01

## 2024-04-01 RX ORDER — DIPHENHYDRAMINE HCL 25 MG
25 TABLET ORAL EVERY 6 HOURS
Qty: 20 TABLET | Refills: 0 | Status: SHIPPED | OUTPATIENT
Start: 2024-04-01

## 2024-04-01 RX ORDER — FAMOTIDINE 20 MG/1
20 TABLET, FILM COATED ORAL 2 TIMES DAILY
Qty: 30 TABLET | Refills: 0 | Status: SHIPPED | OUTPATIENT
Start: 2024-04-01

## 2024-04-01 RX ORDER — FAMOTIDINE 20 MG/1
20 TABLET, FILM COATED ORAL ONCE
Status: COMPLETED | OUTPATIENT
Start: 2024-04-01 | End: 2024-04-01

## 2024-04-01 RX ORDER — PREDNISONE 20 MG/1
40 TABLET ORAL DAILY
Qty: 10 TABLET | Refills: 0 | Status: SHIPPED | OUTPATIENT
Start: 2024-04-01 | End: 2024-04-01

## 2024-04-01 RX ORDER — DIPHENHYDRAMINE HCL 25 MG
25 TABLET ORAL EVERY 6 HOURS
Qty: 20 TABLET | Refills: 0 | Status: SHIPPED | OUTPATIENT
Start: 2024-04-01 | End: 2024-04-01

## 2024-04-01 RX ORDER — PREDNISONE 20 MG/1
40 TABLET ORAL ONCE
Status: COMPLETED | OUTPATIENT
Start: 2024-04-01 | End: 2024-04-01

## 2024-04-01 RX ADMIN — DIPHENHYDRAMINE HYDROCHLORIDE 25 MG: 25 TABLET ORAL at 22:29

## 2024-04-01 RX ADMIN — PREDNISONE 40 MG: 20 TABLET ORAL at 22:29

## 2024-04-01 RX ADMIN — MUPIROCIN: 20 OINTMENT TOPICAL at 22:29

## 2024-04-01 RX ADMIN — FAMOTIDINE 20 MG: 20 TABLET, FILM COATED ORAL at 22:29

## 2024-04-02 NOTE — DISCHARGE INSTRUCTIONS
Apply cream 2-3x daily.  Take Prednisone, Pepcid, Benadryl for lip swelling.   Follow-up with primary care or dermatology for recurrent lesions.

## 2024-04-02 NOTE — ED PROVIDER NOTES
"History  Chief Complaint   Patient presents with    Lip Swelling     Patient reports woke up around 6am with the left side of her upper lip very swollen, patient reports she took a benadryl and iced it and went back to sleep. Reports around 1pm she woke back up and it was still swollen and she went to work. Denies difficulty breathing, denies pain, reports tingling.      Yoko is a 24-year-old female presenting to the emergency department with left upper lip swelling x 12 hours.  She reports that she woke up this a.m. with just the left side of her upper lip enlarged.  She took Benadryl and went back to sleep and did not notice a difference when she woke up.  She reports that she has an allergy to tomatoes and ate mozzarella sticks with marinara sauce yesterday before bed.  Denies knowledge of insect bites in the region.  There is also an overlying area of crusting above her lip.  She states that this is normal for her and it is recurrent \"since she was a baby\" states that it goes through stages of \"crusting and goo\".  She normally applies lotion to the area and it eventually resolves.          Prior to Admission Medications   Prescriptions Last Dose Informant Patient Reported? Taking?   Doxylamine-Pyridoxine (Diclegis) 10-10 MG TBEC   No No   Sig: Take 1 tablet by mouth daily at bedtime as needed (nausea)   Patient not taking: Reported on 2/20/2023   PRENATAL VIT-DOCUSATE-IRON-FA PO   Yes No   Sig: Take by mouth   albuterol (PROVENTIL HFA,VENTOLIN HFA) 90 mcg/act inhaler   No No   Sig: Inhale 2 puffs every 4 (four) hours as needed for wheezing   benzonatate (TESSALON PERLES) 100 mg capsule  Self No No   Sig: Take 1 capsule (100 mg total) by mouth 3 (three) times a day   Patient not taking: Reported on 12/10/2021   budesonide (Pulmicort Flexhaler) 90 MCG/ACT inhaler   No No   Sig: Inhale 1 puff 2 (two) times a day Rinse mouth after use.   fluticasone (FLOVENT HFA) 110 MCG/ACT inhaler  Self Yes No   Sig: Inhale 2 " puffs 2 (two) times a day Rinse mouth after use.   Patient not taking: Reported on 2/20/2023   fluticasone-vilanterol (Breo Ellipta) 200-25 MCG/INH inhaler   No No   Sig: Inhale 1 puff daily Rinse mouth after use.   Patient not taking: Reported on 2/20/2023   guaiFENesin (ROBITUSSIN) 100 MG/5ML oral liquid   No No   Sig: Take 10 mL (200 mg total) by mouth every 4 (four) hours as needed for cough   Patient not taking: Reported on 2/20/2023   magnesium oxide (MAG-OX) 400 mg tablet   Yes No   Sig: Take 400 mg by mouth daily   montelukast (SINGULAIR) 10 mg tablet  Self Yes No   Sig: Take 10 mg by mouth daily at bedtime   Patient not taking: Reported on 2/20/2023   predniSONE 10 mg tablet   No No   Sig: START 4 po daily on days 1-5.  Then 3 po on Day 6.  2 po on Day 7.  1 po on Day 8.  1/2 po on Day 9 and 10.  STOP   Patient not taking: Reported on 2/20/2023   pyridoxine (B-6) 25 MG tablet   No No   Sig: Take 1 tablet (25 mg total) by mouth 3 (three) times a day as needed (nausea)   Patient not taking: Reported on 2/20/2023      Facility-Administered Medications: None       Past Medical History:   Diagnosis Date    Anxiety     Asthma        History reviewed. No pertinent surgical history.    History reviewed. No pertinent family history.  I have reviewed and agree with the history as documented.    E-Cigarette/Vaping     E-Cigarette/Vaping Substances    Nicotine No     THC No     CBD No     Flavoring No     Other No     Unknown No      Social History     Tobacco Use    Smoking status: Never    Smokeless tobacco: Never   Substance Use Topics    Alcohol use: No    Drug use: No       Review of Systems   Constitutional:  Negative for chills and fever.   HENT:  Negative for ear pain and sore throat.    Eyes:  Negative for pain and visual disturbance.   Respiratory:  Negative for cough and shortness of breath.    Cardiovascular:  Negative for chest pain and palpitations.   Gastrointestinal:  Negative for abdominal pain and  vomiting.   Musculoskeletal:  Negative for arthralgias and back pain.   Skin:  Positive for rash. Negative for color change, pallor and wound.   All other systems reviewed and are negative.      Physical Exam  Physical Exam  Vitals and nursing note reviewed.   Constitutional:       General: She is not in acute distress.     Appearance: She is well-developed. She is not ill-appearing, toxic-appearing or diaphoretic.   HENT:      Head: Normocephalic and atraumatic.      Right Ear: Tympanic membrane, ear canal and external ear normal.      Left Ear: Tympanic membrane, ear canal and external ear normal.      Mouth/Throat:      Mouth: Mucous membranes are moist.      Pharynx: Oropharynx is clear.   Eyes:      Extraocular Movements: Extraocular movements intact.      Conjunctiva/sclera: Conjunctivae normal.      Pupils: Pupils are equal, round, and reactive to light.   Cardiovascular:      Rate and Rhythm: Normal rate and regular rhythm.      Pulses: Normal pulses.      Heart sounds: Normal heart sounds. No murmur heard.  Pulmonary:      Effort: Pulmonary effort is normal. No respiratory distress.      Breath sounds: Normal breath sounds. No wheezing, rhonchi or rales.   Abdominal:      Palpations: Abdomen is soft.      Tenderness: There is no abdominal tenderness. There is no right CVA tenderness, left CVA tenderness or guarding.   Musculoskeletal:         General: No swelling.      Cervical back: Neck supple.   Skin:     General: Skin is warm and dry.      Capillary Refill: Capillary refill takes less than 2 seconds.      Findings: Rash present. Rash is crusting and papular.      Comments: Papular and crusting rash above the left and the upper lip.  See media.   Neurological:      General: No focal deficit present.      Mental Status: She is alert and oriented to person, place, and time.   Psychiatric:         Mood and Affect: Mood normal.               Vital Signs  ED Triage Vitals [04/01/24 2121]   Temperature Pulse  Respirations Blood Pressure SpO2   98.9 °F (37.2 °C) 72 16 125/66 97 %      Temp Source Heart Rate Source Patient Position - Orthostatic VS BP Location FiO2 (%)   Oral Monitor Sitting Right arm --      Pain Score       No Pain           Vitals:    04/01/24 2121   BP: 125/66   Pulse: 72   Patient Position - Orthostatic VS: Sitting         Visual Acuity      ED Medications  Medications   predniSONE tablet 40 mg (40 mg Oral Given 4/1/24 2229)   famotidine (PEPCID) tablet 20 mg (20 mg Oral Given 4/1/24 2229)   diphenhydrAMINE (BENADRYL) tablet 25 mg (25 mg Oral Given 4/1/24 2229)       Diagnostic Studies  Results Reviewed       None                   No orders to display              Procedures  Procedures         ED Course                                             Medical Decision Making  Patient presents with unilateral lip swelling and crusting.  DDx includes angioedema, anaphylaxis, urticaria, impetigo, herpes labialis.  Isolated unilateral lip swelling.  No wheezing, airway compromise.  Will treat with prednisone, Pepcid, Benadryl.  Crusting does not appear consistent with herpes labialis, if this is herpes a swab at this time would not be accurate.  Will treat as impetigo with mupirocin to trial for improvement. Discussed findings from the visit with the patient.  We had a conversation regarding supportive care and indications for return.  Recommended appropriate follow-up.  Patient and/or family understand and agree with plan.      Risk  OTC drugs.  Prescription drug management.             Disposition  Final diagnoses:   Swelling of upper lip   Lip lesion     Time reflects when diagnosis was documented in both MDM as applicable and the Disposition within this note       Time User Action Codes Description Comment    4/1/2024  9:53 PM Dina Mendoza [R22.0] Swelling of upper lip     4/1/2024  9:54 PM Dina Mendoza [K13.0] Lip lesion           ED Disposition       ED Disposition   Discharge     Condition   Stable    Date/Time   Mon Apr 1, 2024  9:53 PM    Comment   Yoko Caldwell discharge to home/self care.                   Follow-up Information       Follow up With Specialties Details Why Contact Info Additional Information    [facility]   [address] [address] [phone] [facility] [place], [address], [address] [phone]            Discharge Medication List as of 4/1/2024  9:57 PM        START taking these medications    Details   diphenhydrAMINE (BENADRYL) 25 mg tablet Take 1 tablet (25 mg total) by mouth every 6 (six) hours, Starting Mon 4/1/2024, Normal      famotidine (PEPCID) 20 mg tablet Take 1 tablet (20 mg total) by mouth 2 (two) times a day, Starting Mon 4/1/2024, Normal      !! predniSONE 20 mg tablet Take 2 tablets (40 mg total) by mouth daily, Starting Mon 4/1/2024, Normal       !! - Potential duplicate medications found. Please discuss with provider.        CONTINUE these medications which have NOT CHANGED    Details   albuterol (PROVENTIL HFA,VENTOLIN HFA) 90 mcg/act inhaler Inhale 2 puffs every 4 (four) hours as needed for wheezing, Starting Wed 1/10/2024, Until Thu 1/9/2025 at 2359, Normal      benzonatate (TESSALON PERLES) 100 mg capsule Take 1 capsule (100 mg total) by mouth 3 (three) times a day, Starting Mon 11/22/2021, Normal      budesonide (Pulmicort Flexhaler) 90 MCG/ACT inhaler Inhale 1 puff 2 (two) times a day Rinse mouth after use., Starting Mon 2/20/2023, Normal      Doxylamine-Pyridoxine (Diclegis) 10-10 MG TBEC Take 1 tablet by mouth daily at bedtime as needed (nausea), Starting Wed 12/21/2022, Normal      fluticasone (FLOVENT HFA) 110 MCG/ACT inhaler Inhale 2 puffs 2 (two) times a day Rinse mouth after use., Historical Med      fluticasone-vilanterol (Breo Ellipta) 200-25 MCG/INH inhaler Inhale 1 puff daily Rinse mouth after use., Starting Fri 12/10/2021,  Until Sun 1/9/2022, Normal      guaiFENesin (ROBITUSSIN) 100 MG/5ML oral liquid Take 10 mL (200 mg total) by mouth every 4 (four) hours as needed for cough, Starting Wed 12/21/2022, Normal      magnesium oxide (MAG-OX) 400 mg tablet Take 400 mg by mouth daily, Historical Med      montelukast (SINGULAIR) 10 mg tablet Take 10 mg by mouth daily at bedtime, Historical Med      !! predniSONE 10 mg tablet START 4 po daily on days 1-5.  Then 3 po on Day 6.  2 po on Day 7.  1 po on Day 8.  1/2 po on Day 9 and 10.  STOP, Normal      PRENATAL VIT-DOCUSATE-IRON-FA PO Take by mouth, Historical Med      pyridoxine (B-6) 25 MG tablet Take 1 tablet (25 mg total) by mouth 3 (three) times a day as needed (nausea), Starting Mon 12/12/2022, Normal       !! - Potential duplicate medications found. Please discuss with provider.          No discharge procedures on file.    PDMP Review       None            ED Provider  Electronically Signed by             Dina Mendoza PA-C  04/02/24 0227

## 2024-05-18 ENCOUNTER — APPOINTMENT (EMERGENCY)
Dept: RADIOLOGY | Facility: HOSPITAL | Age: 24
End: 2024-05-18
Payer: COMMERCIAL

## 2024-05-18 ENCOUNTER — HOSPITAL ENCOUNTER (EMERGENCY)
Facility: HOSPITAL | Age: 24
Discharge: HOME | End: 2024-05-18
Attending: EMERGENCY MEDICINE
Payer: COMMERCIAL

## 2024-05-18 VITALS
SYSTOLIC BLOOD PRESSURE: 138 MMHG | RESPIRATION RATE: 16 BRPM | DIASTOLIC BLOOD PRESSURE: 80 MMHG | HEART RATE: 86 BPM | TEMPERATURE: 97.7 F | OXYGEN SATURATION: 99 % | BODY MASS INDEX: 24.97 KG/M2 | WEIGHT: 159.4 LBS

## 2024-05-18 DIAGNOSIS — J45.40 MODERATE PERSISTENT ASTHMA WITHOUT COMPLICATION: ICD-10-CM

## 2024-05-18 DIAGNOSIS — J45.901 ASTHMA EXACERBATION: Primary | ICD-10-CM

## 2024-05-18 DIAGNOSIS — J45.901 EXACERBATION OF ASTHMA, UNSPECIFIED ASTHMA SEVERITY, UNSPECIFIED WHETHER PERSISTENT: ICD-10-CM

## 2024-05-18 PROCEDURE — 99283 EMERGENCY DEPT VISIT LOW MDM: CPT

## 2024-05-18 PROCEDURE — 71045 X-RAY EXAM CHEST 1 VIEW: CPT

## 2024-05-18 PROCEDURE — 99284 EMERGENCY DEPT VISIT MOD MDM: CPT | Performed by: EMERGENCY MEDICINE

## 2024-05-18 RX ORDER — ALBUTEROL SULFATE 90 UG/1
2 AEROSOL, METERED RESPIRATORY (INHALATION) ONCE
Status: COMPLETED | OUTPATIENT
Start: 2024-05-18 | End: 2024-05-18

## 2024-05-18 RX ORDER — PREDNISONE 20 MG/1
60 TABLET ORAL ONCE
Status: COMPLETED | OUTPATIENT
Start: 2024-05-18 | End: 2024-05-18

## 2024-05-18 RX ORDER — PREDNISONE 20 MG/1
20 TABLET ORAL DAILY
Qty: 15 TABLET | Refills: 0 | Status: SHIPPED | OUTPATIENT
Start: 2024-05-18

## 2024-05-18 RX ORDER — SODIUM CHLORIDE FOR INHALATION 0.9 %
3 VIAL, NEBULIZER (ML) INHALATION ONCE
Status: COMPLETED | OUTPATIENT
Start: 2024-05-18 | End: 2024-05-18

## 2024-05-18 RX ADMIN — ALBUTEROL SULFATE 5 MG: 2.5 SOLUTION RESPIRATORY (INHALATION) at 19:09

## 2024-05-18 RX ADMIN — IPRATROPIUM BROMIDE 0.5 MG: 0.5 SOLUTION RESPIRATORY (INHALATION) at 19:09

## 2024-05-18 RX ADMIN — ISODIUM CHLORIDE 3 ML: 0.03 SOLUTION RESPIRATORY (INHALATION) at 19:44

## 2024-05-18 RX ADMIN — PREDNISONE 60 MG: 20 TABLET ORAL at 19:09

## 2024-05-18 RX ADMIN — ALBUTEROL SULFATE 2 PUFF: 90 AEROSOL, METERED RESPIRATORY (INHALATION) at 19:43

## 2024-05-18 RX ADMIN — ALBUTEROL SULFATE 5 MG: 2.5 SOLUTION RESPIRATORY (INHALATION) at 19:44

## 2024-05-18 NOTE — Clinical Note
Yoko Caldwell was seen and treated in our emergency department on 5/18/2024.                Diagnosis:     Yoko  may return to work on return date.    She may return on this date: 05/20/2024         If you have any questions or concerns, please don't hesitate to call.      Eugenio Howard MD    ______________________________           _______________          _______________  Hospital Representative                              Date                                Time

## 2024-05-18 NOTE — ED PROVIDER NOTES
History  Chief Complaint   Patient presents with    Asthma     Patient states she has been having issues with her asthma for a week and it has gotten worse today. Used nebulizer yesterday no relief.      Patient is a 24-year-old female with a significant h/o asthma who presents with a week h/o of progressively worsening asthma.  H/o hospitalizations.  No intubations.  Last steroids January 2024.  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  Used an inhaler of a coworkers PTA with relief.  Last neb use yesterday.  No f/s/c.  Has a pulmonologist visit coming up.  Missed last appt due to ill child.          Prior to Admission Medications   Prescriptions Last Dose Informant Patient Reported? Taking?   Doxylamine-Pyridoxine (Diclegis) 10-10 MG TBEC   No No   Sig: Take 1 tablet by mouth daily at bedtime as needed (nausea)   Patient not taking: Reported on 2/20/2023   PRENATAL VIT-DOCUSATE-IRON-FA PO   Yes No   Sig: Take by mouth   albuterol (PROVENTIL HFA,VENTOLIN HFA) 90 mcg/act inhaler   No No   Sig: Inhale 2 puffs every 4 (four) hours as needed for wheezing   benzonatate (TESSALON PERLES) 100 mg capsule  Self No No   Sig: Take 1 capsule (100 mg total) by mouth 3 (three) times a day   Patient not taking: Reported on 12/10/2021   budesonide (Pulmicort Flexhaler) 90 MCG/ACT inhaler   No No   Sig: Inhale 1 puff 2 (two) times a day Rinse mouth after use.   diphenhydrAMINE (BENADRYL) 25 mg tablet   No No   Sig: Take 1 tablet (25 mg total) by mouth every 6 (six) hours   famotidine (PEPCID) 20 mg tablet   No No   Sig: Take 1 tablet (20 mg total) by mouth 2 (two) times a day   fluticasone (FLOVENT HFA) 110 MCG/ACT inhaler  Self Yes No   Sig: Inhale 2 puffs 2 (two) times a day Rinse mouth after use.   Patient not taking: Reported on 2/20/2023   fluticasone-vilanterol (Breo Ellipta) 200-25 MCG/INH inhaler   No No   Sig: Inhale 1 puff daily Rinse mouth after use.   Patient not taking: Reported on 2/20/2023   guaiFENesin (ROBITUSSIN) 100  MG/5ML oral liquid   No No   Sig: Take 10 mL (200 mg total) by mouth every 4 (four) hours as needed for cough   Patient not taking: Reported on 2/20/2023   magnesium oxide (MAG-OX) 400 mg tablet   Yes No   Sig: Take 400 mg by mouth daily   montelukast (SINGULAIR) 10 mg tablet  Self Yes No   Sig: Take 10 mg by mouth daily at bedtime   Patient not taking: Reported on 2/20/2023   predniSONE 10 mg tablet   No No   Sig: START 4 po daily on days 1-5.  Then 3 po on Day 6.  2 po on Day 7.  1 po on Day 8.  1/2 po on Day 9 and 10.  STOP   Patient not taking: Reported on 2/20/2023   predniSONE 20 mg tablet   No No   Sig: Take 2 tablets (40 mg total) by mouth daily   pyridoxine (B-6) 25 MG tablet   No No   Sig: Take 1 tablet (25 mg total) by mouth 3 (three) times a day as needed (nausea)   Patient not taking: Reported on 2/20/2023      Facility-Administered Medications: None       Past Medical History:   Diagnosis Date    Anxiety     Asthma        History reviewed. No pertinent surgical history.    History reviewed. No pertinent family history.  I have reviewed and agree with the history as documented.    E-Cigarette/Vaping     E-Cigarette/Vaping Substances    Nicotine No     THC No     CBD No     Flavoring No     Other No     Unknown No      Social History     Tobacco Use    Smoking status: Never    Smokeless tobacco: Never   Substance Use Topics    Alcohol use: No    Drug use: No       Review of Systems   Constitutional: Negative.    HENT: Negative.     Eyes: Negative.    Respiratory:  Positive for cough, shortness of breath and wheezing.    Cardiovascular: Negative.    Gastrointestinal: Negative.    Endocrine: Negative.    Genitourinary: Negative.    Musculoskeletal: Negative.    Skin: Negative.    Allergic/Immunologic: Negative.    Neurological: Negative.    Hematological: Negative.    Psychiatric/Behavioral: Negative.     All other systems reviewed and are negative.      Physical Exam  Physical Exam  Vitals and nursing  note reviewed.   Constitutional:       Appearance: Normal appearance. She is normal weight.   HENT:      Head: Normocephalic and atraumatic.      Nose: Congestion present.   Cardiovascular:      Rate and Rhythm: Normal rate and regular rhythm.      Pulses: Normal pulses.      Heart sounds: Normal heart sounds.   Pulmonary:      Breath sounds: Wheezing present.      Comments: Scant exp wheeze  Abdominal:      General: Bowel sounds are normal.      Palpations: Abdomen is soft.   Musculoskeletal:         General: Normal range of motion.      Cervical back: Normal range of motion and neck supple.   Skin:     General: Skin is warm and dry.      Capillary Refill: Capillary refill takes less than 2 seconds.   Neurological:      General: No focal deficit present.      Mental Status: She is alert and oriented to person, place, and time.   Psychiatric:         Mood and Affect: Mood normal.         Behavior: Behavior normal.         Vital Signs  ED Triage Vitals   Temperature Pulse Respirations Blood Pressure SpO2   05/18/24 1856 05/18/24 1856 05/18/24 1856 05/18/24 1856 05/18/24 1856   97.7 °F (36.5 °C) 86 16 138/80 99 %      Temp Source Heart Rate Source Patient Position - Orthostatic VS BP Location FiO2 (%)   05/18/24 1856 05/18/24 1856 05/18/24 1856 05/18/24 1856 --   Tympanic Monitor Sitting Left arm       Pain Score       05/18/24 1912       6           Vitals:    05/18/24 1856   BP: 138/80   Pulse: 86   Patient Position - Orthostatic VS: Sitting         Visual Acuity      ED Medications  Medications   albuterol inhalation solution 5 mg (5 mg Nebulization Given 5/18/24 1909)   ipratropium (ATROVENT) 0.02 % inhalation solution 0.5 mg (0.5 mg Nebulization Given 5/18/24 1909)   predniSONE tablet 60 mg (60 mg Oral Given 5/18/24 1909)   albuterol inhalation solution 5 mg (5 mg Nebulization Given 5/18/24 1944)   albuterol (PROVENTIL HFA,VENTOLIN HFA) inhaler 2 puff (2 puffs Inhalation Given 5/18/24 1943)   sodium chloride 0.9  % inhalation solution 3 mL (3 mL Nebulization Given 5/18/24 1944)       Diagnostic Studies  Results Reviewed       None                   XR chest portable   ED Interpretation by Eugenio Howard MD (05/18 2030)   NAD      Final Result by Alberto Zendejas MD (05/18 2035)      No acute cardiopulmonary disease.            Workstation performed: [id number]                    Procedures  Procedures         ED Course  ED Course as of 05/19/24 2255   Sat May 18, 2024   1945 Feeling improved post first neb.  Would like additional.     2007 Lungs clear.  When speaking to pt. C/o sternal pain.  Cxr ordered.   2034 Cxr wnl.  Will dc.                                SBIRT 20yo+      Flowsheet Row Most Recent Value   Initial Alcohol Screen: US AUDIT-C     1. How often do you have a drink containing alcohol? 0 Filed at: 05/18/2024 1855   2. How many drinks containing alcohol do you have on a typical day you are drinking?  0 Filed at: 05/18/2024 1855   3a. Male UNDER 65: How often do you have five or more drinks on one occasion? 0 Filed at: 05/18/2024 1855   3b. FEMALE Any Age, or MALE 65+: How often do you have 4 or more drinks on one occassion? 0 Filed at: 05/18/2024 1855   Audit-C Score 0 Filed at: 05/18/2024 1855   JOEY: How many times in the past year have you...    Used an illegal drug or used a prescription medication for non-medical reasons? Never Filed at: 05/18/2024 1855                      Medical Decision Making  Problems Addressed:  Asthma exacerbation: chronic illness or injury with exacerbation, progression, or side effects of treatment     Details: Improved post meds.  No distress at time of discharge.     Amount and/or Complexity of Data Reviewed  Radiology: ordered and independent interpretation performed. Decision-making details documented in ED Course.    Risk  Prescription drug management.             Disposition  Final diagnoses:   Asthma exacerbation     Time reflects when diagnosis was  documented in both MDM as applicable and the Disposition within this note       Time User Action Codes Description Comment    5/18/2024  8:35 PM FatimahSilas garrettg Add [J45.901] Asthma exacerbation           ED Disposition       ED Disposition   Discharge    Condition   Stable    Date/Time   Sat May 18, 2024 2035    Comment   Yoko Caldwell discharge to home/self care.                   Follow-up Information       Follow up With Specialties Details Why Contact Info    Sandhya Felix DO [facility]   [address] [place]   [facility] [id number] [phone]              Discharge Medication List as of 5/18/2024  8:36 PM        START taking these medications    Details   !! predniSONE 20 mg tablet Take 1 tablet (20 mg total) by mouth daily, Starting Sat 5/18/2024, Normal       !! - Potential duplicate medications found. Please discuss with provider.        CONTINUE these medications which have NOT CHANGED    Details   albuterol (PROVENTIL HFA,VENTOLIN HFA) 90 mcg/act inhaler Inhale 2 puffs every 4 (four) hours as needed for wheezing, Starting Wed 1/10/2024, Until Thu 1/9/2025 at 2359, Normal      benzonatate (TESSALON PERLES) 100 mg capsule Take 1 capsule (100 mg total) by mouth 3 (three) times a day, Starting Mon 11/22/2021, Normal      budesonide (Pulmicort Flexhaler) 90 MCG/ACT inhaler Inhale 1 puff 2 (two) times a day Rinse mouth after use., Starting Mon 2/20/2023, Normal      diphenhydrAMINE (BENADRYL) 25 mg tablet Take 1 tablet (25 mg total) by mouth every 6 (six) hours, Starting Mon 4/1/2024, Normal      Doxylamine-Pyridoxine (Diclegis) 10-10 MG TBEC Take 1 tablet by mouth daily at bedtime as needed (nausea), Starting Wed 12/21/2022, Normal      famotidine (PEPCID) 20 mg tablet Take 1 tablet (20 mg total) by mouth 2 (two) times a day, Starting Mon 4/1/2024, Normal      fluticasone (FLOVENT HFA) 110 MCG/ACT inhaler Inhale 2 puffs 2 (two) times a day Rinse mouth after use., Historical Med       fluticasone-vilanterol (Breo Ellipta) 200-25 MCG/INH inhaler Inhale 1 puff daily Rinse mouth after use., Starting Fri 12/10/2021, Until Sun 1/9/2022, Normal      guaiFENesin (ROBITUSSIN) 100 MG/5ML oral liquid Take 10 mL (200 mg total) by mouth every 4 (four) hours as needed for cough, Starting Wed 12/21/2022, Normal      magnesium oxide (MAG-OX) 400 mg tablet Take 400 mg by mouth daily, Historical Med      montelukast (SINGULAIR) 10 mg tablet Take 10 mg by mouth daily at bedtime, Historical Med      !! predniSONE 10 mg tablet START 4 po daily on days 1-5.  Then 3 po on Day 6.  2 po on Day 7.  1 po on Day 8.  1/2 po on Day 9 and 10.  STOP, Normal      !! predniSONE 20 mg tablet Take 2 tablets (40 mg total) by mouth daily, Starting Mon 4/1/2024, Normal      PRENATAL VIT-DOCUSATE-IRON-FA PO Take by mouth, Historical Med      pyridoxine (B-6) 25 MG tablet Take 1 tablet (25 mg total) by mouth 3 (three) times a day as needed (nausea), Starting Mon 12/12/2022, Normal       !! - Potential duplicate medications found. Please discuss with provider.          No discharge procedures on file.    PDMP Review       None            ED Provider  Electronically Signed by             Eugenio Howard MD  05/19/24 8686

## 2024-05-20 ENCOUNTER — TELEPHONE (OUTPATIENT)
Age: 24
End: 2024-05-20

## 2024-05-20 RX ORDER — ALBUTEROL SULFATE 2.5 MG/3ML
SOLUTION RESPIRATORY (INHALATION)
Qty: 75 ML | Refills: 0 | Status: SHIPPED | OUTPATIENT
Start: 2024-05-20

## 2024-05-20 RX ORDER — ALBUTEROL SULFATE 90 UG/1
AEROSOL, METERED RESPIRATORY (INHALATION)
Qty: 8.5 G | Refills: 0 | Status: SHIPPED | OUTPATIENT
Start: 2024-05-20

## 2024-05-20 NOTE — TELEPHONE ENCOUNTER
Patient called to make an apt for skin screen/ offered first available/ she does have an apt on the 28th of this month with her pcp so she'll discuss her concern and will see if she needs to be referred/ she'll call back if needed

## 2024-06-11 ENCOUNTER — TELEPHONE (OUTPATIENT)
Dept: DENTISTRY | Facility: CLINIC | Age: 24
End: 2024-06-11

## 2024-06-11 DIAGNOSIS — K02.9 DENTAL CARIES: Primary | ICD-10-CM

## 2024-06-11 NOTE — TELEPHONE ENCOUNTER
PT LVM.  I returned call. PT is looking for an updated referral because she has an upcoming appt at [facility] and they would like a newer referral.    PT had pain and swelling a few months ago, went to an Urgent Care, but recently has started the antibiotic previously prescribed and the pain and swelling has subsided.    PT will  the updated referral form on Friday.    SB

## 2024-06-14 ENCOUNTER — TELEPHONE (OUTPATIENT)
Dept: DENTISTRY | Facility: CLINIC | Age: 24
End: 2024-06-14

## 2024-06-14 NOTE — TELEPHONE ENCOUNTER
PT stopped in to  her referral.  While here she inquired about future scheduled dates and when she learned that they were on Mondays she asked that they be rescheduled.  7/22/24 to 8/22/25 and 7/29/24 to 8/28/25.    PT inquired about next appt to be scheduled with Stefani - please advise as to whether or not I should schedule that.  She would prefer a Friday, if possible.    Otherwise, PT has a consult scheduled at an oral surgeon in July.    SB

## 2024-06-24 ENCOUNTER — OFFICE VISIT (OUTPATIENT)
Dept: URGENT CARE | Facility: MEDICAL CENTER | Age: 24
End: 2024-06-24
Payer: COMMERCIAL

## 2024-06-24 VITALS
HEART RATE: 91 BPM | OXYGEN SATURATION: 96 % | WEIGHT: 149 LBS | RESPIRATION RATE: 20 BRPM | BODY MASS INDEX: 23.34 KG/M2 | TEMPERATURE: 97.6 F

## 2024-06-24 DIAGNOSIS — J45.41 MODERATE PERSISTENT ASTHMA WITH ACUTE EXACERBATION: ICD-10-CM

## 2024-06-24 DIAGNOSIS — J45.40 MODERATE PERSISTENT ASTHMA WITHOUT COMPLICATION: ICD-10-CM

## 2024-06-24 PROCEDURE — S9088 SERVICES PROVIDED IN URGENT: HCPCS | Performed by: NURSE PRACTITIONER

## 2024-06-24 PROCEDURE — 99214 OFFICE O/P EST MOD 30 MIN: CPT | Performed by: NURSE PRACTITIONER

## 2024-06-24 RX ORDER — ALBUTEROL SULFATE 90 UG/1
1 AEROSOL, METERED RESPIRATORY (INHALATION) EVERY 6 HOURS PRN
Qty: 8.5 G | Refills: 0 | Status: SHIPPED | OUTPATIENT
Start: 2024-06-24

## 2024-06-24 RX ORDER — BUDESONIDE 90 UG/1
1 AEROSOL, POWDER RESPIRATORY (INHALATION) 2 TIMES DAILY
Qty: 1 EACH | Refills: 3 | Status: SHIPPED | OUTPATIENT
Start: 2024-06-24

## 2024-06-24 NOTE — LETTER
June 24, 2024     Patient: Yoko Caldwell   YOB: 2000   Date of Visit: 6/24/2024       To Whom It May Concern:    It is my medical opinion that Yoko Caldwell may return to work on 6/25/2024 .    If you have any questions or concerns, please don't hesitate to call.         Sincerely,        COREY Isabel    CC: No Recipients

## 2024-06-24 NOTE — PATIENT INSTRUCTIONS
You have been prescribed albuterol MDI and pulmicort.   You are to stay hydrated.  See your pulmonologist next with with your scheduled appointment.   Follow up with your PCP in 3-5 days  Go to the ED if symptoms worsen

## 2024-06-24 NOTE — PROGRESS NOTES
[facility]        NAME: Yoko Caldwell is a 24 y.o. female  : 2000    MRN: [id number]  DATE: 2024  TIME: 6:03 PM    Assessment and Plan   No primary diagnosis found.  1. Moderate persistent asthma without complication  albuterol (PROVENTIL HFA,VENTOLIN HFA) 90 mcg/act inhaler      2. Moderate persistent asthma with acute exacerbation  budesonide (Pulmicort Flexhaler) 90 MCG/ACT inhaler            Patient Instructions       Follow up with PCP in 3-5 days.  Proceed to  ER if symptoms worsen.    If tests have been performed at [facility], our office will contact you with results if changes need to be made to the care plan discussed with you at the visit.  You can review your full results on [facility].  You have been prescribed albuterol MDI and pulmicort.   You are to stay hydrated.  See your pulmonologist next with with your scheduled appointment.   Follow up with your PCP in 3-5 days  Go to the ED if symptoms worsen         Chief Complaint     Chief Complaint   Patient presents with    Asthma     Started having asthma attacks last night and lingered throughout the night Used her albuterol inhaler last night and at 730 this am. Is out and needs refill. Did not use her nebulizer because of the smoke at work. Together it makes her sick. Has an pulmonology appt next week for her pulmacort         History of Present Illness       This is a 24 year old female who has hx of asthma and since the weather has been hot she has had difficulty breathing and wheezing. She states she used her albuterol MDI which has helped but needs refill.  She also states that she hasn't seen her pulmonologist but needs her Pulmicort as well. States appt next with with pulmonology.  She denies fevers, chills, n/v/d.  She states she needs a work note as well.      Asthma  She complains of shortness of breath. Her past medical history is significant for asthma.       Review of Systems   Review of Systems    Constitutional: Negative.    HENT: Negative.     Eyes: Negative.    Respiratory:  Positive for chest tightness and shortness of breath.    Cardiovascular: Negative.    Gastrointestinal: Negative.    Endocrine: Negative.    Genitourinary: Negative.    Musculoskeletal: Negative.    Skin: Negative.    Allergic/Immunologic: Negative.    Neurological: Negative.    Hematological: Negative.    Psychiatric/Behavioral: Negative.           Current Medications       Current Outpatient Medications:     albuterol (2.5 mg/3 mL) 0.083 % nebulizer solution, ONE vial via nebulizer EVERY 6 HOURS AS NEEDED for wheezing or shortness of breath, Disp: 75 mL, Rfl: 0    albuterol (PROVENTIL HFA,VENTOLIN HFA) 90 mcg/act inhaler, Inhale 1 puff every 6 (six) hours as needed for wheezing or shortness of breath, Disp: 8.5 g, Rfl: 0    budesonide (Pulmicort Flexhaler) 90 MCG/ACT inhaler, Inhale 1 puff 2 (two) times a day Rinse mouth after use., Disp: 1 each, Rfl: 3    famotidine (PEPCID) 20 mg tablet, Take 1 tablet (20 mg total) by mouth 2 (two) times a day, Disp: 30 tablet, Rfl: 0    montelukast (SINGULAIR) 10 mg tablet, Take 10 mg by mouth daily at bedtime, Disp: , Rfl:     predniSONE 20 mg tablet, Take 2 tablets (40 mg total) by mouth daily, Disp: 10 tablet, Rfl: 0    predniSONE 20 mg tablet, Take 1 tablet (20 mg total) by mouth daily, Disp: 15 tablet, Rfl: 0    PRENATAL VIT-DOCUSATE-IRON-FA PO, Take by mouth, Disp: , Rfl:     benzonatate (TESSALON PERLES) 100 mg capsule, Take 1 capsule (100 mg total) by mouth 3 (three) times a day (Patient not taking: Reported on 12/10/2021), Disp: 30 capsule, Rfl: 0    diphenhydrAMINE (BENADRYL) 25 mg tablet, Take 1 tablet (25 mg total) by mouth every 6 (six) hours (Patient not taking: Reported on 6/24/2024), Disp: 20 tablet, Rfl: 0    Doxylamine-Pyridoxine (Diclegis) 10-10 MG TBEC, Take 1 tablet by mouth daily at bedtime as needed (nausea) (Patient not taking: Reported on 2/20/2023), Disp: 7 tablet, Rfl:  0    fluticasone (FLOVENT HFA) 110 MCG/ACT inhaler, Inhale 2 puffs 2 (two) times a day Rinse mouth after use. (Patient not taking: Reported on 2/20/2023), Disp: , Rfl:     fluticasone-vilanterol (Breo Ellipta) 200-25 MCG/INH inhaler, Inhale 1 puff daily Rinse mouth after use. (Patient not taking: Reported on 2/20/2023), Disp: 60 blister, Rfl: 3    guaiFENesin (ROBITUSSIN) 100 MG/5ML oral liquid, Take 10 mL (200 mg total) by mouth every 4 (four) hours as needed for cough (Patient not taking: Reported on 2/20/2023), Disp: 60 mL, Rfl: 0    magnesium oxide (MAG-OX) 400 mg tablet, Take 400 mg by mouth daily (Patient not taking: Reported on 6/24/2024), Disp: , Rfl:     predniSONE 10 mg tablet, START 4 po daily on days 1-5.  Then 3 po on Day 6.  2 po on Day 7.  1 po on Day 8.  1/2 po on Day 9 and 10.  STOP (Patient not taking: Reported on 2/20/2023), Disp: 27 tablet, Rfl: 0    pyridoxine (B-6) 25 MG tablet, Take 1 tablet (25 mg total) by mouth 3 (three) times a day as needed (nausea) (Patient not taking: Reported on 2/20/2023), Disp: 30 tablet, Rfl: 0    Current Allergies     Allergies as of 06/24/2024 - Reviewed 06/24/2024   Allergen Reaction Noted    Tomato (diagnostic) - food allergy Angioedema 11/19/2021    Citrus - food allergy Cough and Throat Swelling 11/19/2021    Latex Itching 12/27/2022    Nuts - food allergy Throat Swelling and Hives 11/19/2021    Other Wheezing and Itching 12/21/2022    Lactose - food allergy GI Intolerance 08/18/2023            The following portions of the patient's history were reviewed and updated as appropriate: allergies, current medications, past family history, past medical history, past social history, past surgical history and problem list.     Past Medical History:   Diagnosis Date    Anxiety     Asthma        History reviewed. No pertinent surgical history.    History reviewed. No pertinent family history.      Medications have been verified.        Objective   Pulse 91   Temp 97.6  °F (36.4 °C)   Resp 20   Wt 67.6 kg (149 lb)   SpO2 96%   BMI 23.34 kg/m²   No LMP recorded.       Physical Exam     Physical Exam  Vitals and nursing note reviewed.   Constitutional:       General: She is not in acute distress.     Appearance: Normal appearance. She is normal weight. She is not ill-appearing, toxic-appearing or diaphoretic.   HENT:      Head: Normocephalic and atraumatic.      Nose: Nose normal.      Mouth/Throat:      Mouth: Mucous membranes are moist.   Eyes:      Extraocular Movements: Extraocular movements intact.   Cardiovascular:      Rate and Rhythm: Normal rate and regular rhythm.      Pulses: Normal pulses.      Heart sounds: Normal heart sounds. No murmur heard.  Pulmonary:      Effort: Pulmonary effort is normal. No respiratory distress.      Breath sounds: Normal breath sounds. No stridor. No wheezing, rhonchi or rales.   Chest:      Chest wall: No tenderness.   Musculoskeletal:         General: Normal range of motion.      Cervical back: Normal range of motion and neck supple.   Skin:     General: Skin is warm and dry.      Capillary Refill: Capillary refill takes less than 2 seconds.   Neurological:      General: No focal deficit present.      Mental Status: She is alert and oriented to person, place, and time.   Psychiatric:         Mood and Affect: Mood normal.         Behavior: Behavior normal.         Thought Content: Thought content normal.         Judgment: Judgment normal.

## 2025-04-13 ENCOUNTER — HOSPITAL ENCOUNTER (EMERGENCY)
Facility: HOSPITAL | Age: 25
Discharge: HOME | End: 2025-04-13
Attending: EMERGENCY MEDICINE
Payer: COMMERCIAL

## 2025-04-13 VITALS
OXYGEN SATURATION: 100 % | RESPIRATION RATE: 22 BRPM | TEMPERATURE: 98.7 F | HEART RATE: 81 BPM | DIASTOLIC BLOOD PRESSURE: 53 MMHG | SYSTOLIC BLOOD PRESSURE: 110 MMHG | BODY MASS INDEX: 22.99 KG/M2 | WEIGHT: 146.8 LBS

## 2025-04-13 DIAGNOSIS — J45.41 MODERATE PERSISTENT ASTHMA WITH ACUTE EXACERBATION: Primary | ICD-10-CM

## 2025-04-13 DIAGNOSIS — J30.9 ALLERGIC RHINITIS: ICD-10-CM

## 2025-04-13 DIAGNOSIS — S63.502A SPRAIN OF LEFT WRIST, INITIAL ENCOUNTER: ICD-10-CM

## 2025-04-13 PROCEDURE — 99283 EMERGENCY DEPT VISIT LOW MDM: CPT

## 2025-04-13 PROCEDURE — 99284 EMERGENCY DEPT VISIT MOD MDM: CPT | Performed by: EMERGENCY MEDICINE

## 2025-04-13 RX ORDER — FLUTICASONE PROPIONATE 50 MCG
1 SPRAY, SUSPENSION (ML) NASAL DAILY
Qty: 16 G | Refills: 0 | Status: SHIPPED | OUTPATIENT
Start: 2025-04-13

## 2025-04-13 RX ORDER — ALBUTEROL SULFATE 90 UG/1
2 INHALANT RESPIRATORY (INHALATION) ONCE
Status: COMPLETED | OUTPATIENT
Start: 2025-04-13 | End: 2025-04-13

## 2025-04-13 RX ORDER — CETIRIZINE HYDROCHLORIDE, PSEUDOEPHEDRINE HYDROCHLORIDE 5; 120 MG/1; MG/1
1 TABLET, FILM COATED, EXTENDED RELEASE ORAL 2 TIMES DAILY
Qty: 60 TABLET | Refills: 0 | Status: SHIPPED | OUTPATIENT
Start: 2025-04-13

## 2025-04-13 RX ADMIN — ALBUTEROL SULFATE 2 PUFF: 90 AEROSOL, METERED RESPIRATORY (INHALATION) at 21:50

## 2025-04-13 NOTE — Clinical Note
Yoko Caldwell was seen and treated in our emergency department on 4/13/2025.    No restrictions            Diagnosis:     Yoko  may return to work on return date.    She may return on this date: 04/14/2025         If you have any questions or concerns, please don't hesitate to call.      Eugenio Howard MD    ______________________________           _______________          _______________  Hospital Representative                              Date                                Time

## 2025-04-14 NOTE — ED PROVIDER NOTES
Time reflects when diagnosis was documented in both MDM as applicable and the Disposition within this note       Time User Action Codes Description Comment    4/13/2025  9:46 PM Eugenio Howard Add [J45.41] Moderate persistent asthma with acute exacerbation     4/13/2025  9:46 PM Eugenio Howard Add [J30.9] Allergic rhinitis     4/13/2025  9:46 PM Eugenio Howard Add [S63.502A] Sprain of left wrist, initial encounter           ED Disposition       ED Disposition   Discharge    Condition   Stable    Date/Time   Sun Apr 13, 2025  9:46 PM    Comment   Yoko Caldwell discharge to home/self care.                   Assessment & Plan       Medical Decision Making  Problems Addressed:  Allergic rhinitis: chronic illness or injury with exacerbation, progression, or side effects of treatment     Details: Just moved back to [facility] known for allergies.  Will place back on meds.    Sprain of left wrist, initial encounter: acute illness or injury     Details: No direct trauma. No bony ttp, deformity.  Splinted supportive care.     Risk  OTC drugs.  Prescription drug management.             Medications   albuterol (PROVENTIL HFA,VENTOLIN HFA) inhaler 2 puff (2 puffs Inhalation Given 4/13/25 2150)       ED Risk Strat Scores                    No data recorded        SBIRT 20yo+      Flowsheet Row Most Recent Value   Initial Alcohol Screen: US AUDIT-C     1. How often do you have a drink containing alcohol? 0 Filed at: 04/13/2025 2152   2. How many drinks containing alcohol do you have on a typical day you are drinking?  0 Filed at: 04/13/2025 2152   3b. FEMALE Any Age, or MALE 65+: How often do you have 4 or more drinks on one occassion? 0 Filed at: 04/13/2025 2152   Audit-C Score 0 Filed at: 04/13/2025 2152   JOEY: How many times in the past year have you...    Used an illegal drug or used a prescription medication for non-medical reasons? Never Filed at: 04/13/2025 2152                            History of Present Illness        Chief Complaint   Patient presents with    Sore Throat     Pt reports recently moving to [place] and reports her asthma has gotten worse.  Has been doing treatments nightly but is starting to run out of meds, has missed last PCP appoitnment.  Pt states she I beginning to have a sore throat.  Current chest tightness but doesn't feel like she is having an asthma attack.        Past Medical History:   Diagnosis Date    Anxiety     Asthma       History reviewed. No pertinent surgical history.   History reviewed. No pertinent family history.   Social History     Tobacco Use    Smoking status: Never    Smokeless tobacco: Never   Substance Use Topics    Alcohol use: No    Drug use: No      E-Cigarette/Vaping      E-Cigarette/Vaping Substances    Nicotine No     THC No     CBD No     Flavoring No     Other No     Unknown No       I have reviewed and agree with the history as documented.     Patient is a 25-year-old female with a h/o asthma who presents with 2 complaints.  1.  Sore throat for last 2 days.  +congestion with non productive cough.  No meds.  H/o seasonal allergies.  Just moved back to [place] from [place].  No fever.  2.  C/o left wrist pain s/o moving.  No direct trauma/fall.  Took some OTC meds.  Worse with movement. No numbness/tingling.          Review of Systems   Constitutional:  Negative for chills and fever.   HENT:  Positive for congestion and sore throat. Negative for ear pain.    Eyes:  Negative for pain and visual disturbance.   Respiratory:  Positive for cough. Negative for shortness of breath.    Cardiovascular:  Negative for chest pain and palpitations.   Gastrointestinal:  Negative for abdominal pain and vomiting.   Genitourinary:  Negative for dysuria and hematuria.   Musculoskeletal:  Positive for arthralgias. Negative for back pain.   Skin:  Negative for color change and rash.   Neurological:  Negative for seizures and syncope.   All other systems reviewed and are  negative.          Objective       ED Triage Vitals [04/13/25 2139]   Temperature Pulse Blood Pressure Respirations SpO2 Patient Position - Orthostatic VS   98.7 °F (37.1 °C) 81 110/53 22 100 % Sitting      Temp Source Heart Rate Source BP Location FiO2 (%) Pain Score    Oral Monitor Left arm -- --      Vitals      Date and Time Temp Pulse SpO2 Resp BP Pain Score FACES Pain Rating User   04/13/25 2139 98.7 °F (37.1 °C) 81 100 % 22 110/53 -- -- KA            Physical Exam  Vitals and nursing note reviewed.   Constitutional:       Appearance: She is well-developed and normal weight.   HENT:      Head: Normocephalic and atraumatic.      Right Ear: Tympanic membrane and ear canal normal.      Left Ear: Tympanic membrane and ear canal normal.      Nose: Congestion and rhinorrhea present.      Mouth/Throat:      Mouth: Mucous membranes are moist.      Pharynx: No pharyngeal swelling, oropharyngeal exudate or posterior oropharyngeal erythema.      Tonsils: No tonsillar exudate or tonsillar abscesses.   Eyes:      Conjunctiva/sclera: Conjunctivae normal.      Pupils: Pupils are equal, round, and reactive to light.   Cardiovascular:      Rate and Rhythm: Normal rate and regular rhythm.   Pulmonary:      Effort: Pulmonary effort is normal. No respiratory distress.      Breath sounds: Normal breath sounds. No stridor. No wheezing, rhonchi or rales.   Abdominal:      General: Bowel sounds are normal.      Palpations: Abdomen is soft.   Musculoskeletal:      Cervical back: Normal range of motion and neck supple.      Comments: No point bony ttp at the left wrist.  No hand or elbow pain.  Mild pain with flexion.  No snuffbox ttp.    Lymphadenopathy:      Cervical: No cervical adenopathy.   Skin:     General: Skin is warm and dry.   Neurological:      Mental Status: She is alert.         Results Reviewed       None            No orders to display       Procedures    ED Medication and Procedure Management   Prior to Admission  Medications   Prescriptions Last Dose Informant Patient Reported? Taking?   Doxylamine-Pyridoxine (Diclegis) 10-10 MG TBEC   No No   Sig: Take 1 tablet by mouth daily at bedtime as needed (nausea)   Patient not taking: Reported on 2/20/2023   PRENATAL VIT-DOCUSATE-IRON-FA PO   Yes No   Sig: Take by mouth   albuterol (2.5 mg/3 mL) 0.083 % nebulizer solution   No No   Sig: ONE vial via nebulizer EVERY 6 HOURS AS NEEDED for wheezing or shortness of breath   albuterol (PROVENTIL HFA,VENTOLIN HFA) 90 mcg/act inhaler   No No   Sig: Inhale 1 puff every 6 (six) hours as needed for wheezing or shortness of breath   benzonatate (TESSALON PERLES) 100 mg capsule  Self No No   Sig: Take 1 capsule (100 mg total) by mouth 3 (three) times a day   Patient not taking: Reported on 12/10/2021   budesonide (Pulmicort Flexhaler) 90 MCG/ACT inhaler   No No   Sig: Inhale 1 puff 2 (two) times a day Rinse mouth after use.   diphenhydrAMINE (BENADRYL) 25 mg tablet   No No   Sig: Take 1 tablet (25 mg total) by mouth every 6 (six) hours   Patient not taking: Reported on 6/24/2024   famotidine (PEPCID) 20 mg tablet   No No   Sig: Take 1 tablet (20 mg total) by mouth 2 (two) times a day   fluticasone (FLOVENT HFA) 110 MCG/ACT inhaler  Self Yes No   Sig: Inhale 2 puffs 2 (two) times a day Rinse mouth after use.   Patient not taking: Reported on 2/20/2023   fluticasone-vilanterol (Breo Ellipta) 200-25 MCG/INH inhaler   No No   Sig: Inhale 1 puff daily Rinse mouth after use.   Patient not taking: Reported on 2/20/2023   guaiFENesin (ROBITUSSIN) 100 MG/5ML oral liquid   No No   Sig: Take 10 mL (200 mg total) by mouth every 4 (four) hours as needed for cough   Patient not taking: Reported on 2/20/2023   magnesium oxide (MAG-OX) 400 mg tablet   Yes No   Sig: Take 400 mg by mouth daily   Patient not taking: Reported on 6/24/2024   montelukast (SINGULAIR) 10 mg tablet  Self Yes No   Sig: Take 10 mg by mouth daily at bedtime   predniSONE 10 mg tablet   No  No   Sig: START 4 po daily on days 1-5.  Then 3 po on Day 6.  2 po on Day 7.  1 po on Day 8.  1/2 po on Day 9 and 10.  STOP   Patient not taking: Reported on 2/20/2023   predniSONE 20 mg tablet   No No   Sig: Take 2 tablets (40 mg total) by mouth daily   predniSONE 20 mg tablet   No No   Sig: Take 1 tablet (20 mg total) by mouth daily   pyridoxine (B-6) 25 MG tablet   No No   Sig: Take 1 tablet (25 mg total) by mouth 3 (three) times a day as needed (nausea)   Patient not taking: Reported on 2/20/2023      Facility-Administered Medications: None     Discharge Medication List as of 4/13/2025  9:48 PM        START taking these medications    Details   cetirizine-pseudoephedrine (ZyrTEC-D) 5-120 MG per tablet Take 1 tablet by mouth 2 (two) times a day, Starting Sun 4/13/2025, Normal      fluticasone (FLONASE) 50 mcg/act nasal spray 1 spray into each nostril daily, Starting Sun 4/13/2025, Normal           CONTINUE these medications which have NOT CHANGED    Details   albuterol (2.5 mg/3 mL) 0.083 % nebulizer solution ONE vial via nebulizer EVERY 6 HOURS AS NEEDED for wheezing or shortness of breath, Normal      albuterol (PROVENTIL HFA,VENTOLIN HFA) 90 mcg/act inhaler Inhale 1 puff every 6 (six) hours as needed for wheezing or shortness of breath, Starting Mon 6/24/2024, Normal      benzonatate (TESSALON PERLES) 100 mg capsule Take 1 capsule (100 mg total) by mouth 3 (three) times a day, Starting Mon 11/22/2021, Normal      budesonide (Pulmicort Flexhaler) 90 MCG/ACT inhaler Inhale 1 puff 2 (two) times a day Rinse mouth after use., Starting Mon 6/24/2024, Normal      diphenhydrAMINE (BENADRYL) 25 mg tablet Take 1 tablet (25 mg total) by mouth every 6 (six) hours, Starting Mon 4/1/2024, Normal      Doxylamine-Pyridoxine (Diclegis) 10-10 MG TBEC Take 1 tablet by mouth daily at bedtime as needed (nausea), Starting Wed 12/21/2022, Normal      famotidine (PEPCID) 20 mg tablet Take 1 tablet (20 mg total) by mouth 2 (two) times  a day, Starting Mon 4/1/2024, Normal      fluticasone (FLOVENT HFA) 110 MCG/ACT inhaler Inhale 2 puffs 2 (two) times a day Rinse mouth after use., Historical Med      fluticasone-vilanterol (Breo Ellipta) 200-25 MCG/INH inhaler Inhale 1 puff daily Rinse mouth after use., Starting Fri 12/10/2021, Until Sun 1/9/2022, Normal      guaiFENesin (ROBITUSSIN) 100 MG/5ML oral liquid Take 10 mL (200 mg total) by mouth every 4 (four) hours as needed for cough, Starting Wed 12/21/2022, Normal      magnesium oxide (MAG-OX) 400 mg tablet Take 400 mg by mouth daily, Historical Med      montelukast (SINGULAIR) 10 mg tablet Take 10 mg by mouth daily at bedtime, Historical Med      !! predniSONE 10 mg tablet START 4 po daily on days 1-5.  Then 3 po on Day 6.  2 po on Day 7.  1 po on Day 8.  1/2 po on Day 9 and 10.  STOP, Normal      !! predniSONE 20 mg tablet Take 2 tablets (40 mg total) by mouth daily, Starting Mon 4/1/2024, Normal      !! predniSONE 20 mg tablet Take 1 tablet (20 mg total) by mouth daily, Starting Sat 5/18/2024, Normal      PRENATAL VIT-DOCUSATE-IRON-FA PO Take by mouth, Historical Med      pyridoxine (B-6) 25 MG tablet Take 1 tablet (25 mg total) by mouth 3 (three) times a day as needed (nausea), Starting Mon 12/12/2022, Normal       !! - Potential duplicate medications found. Please discuss with provider.        No discharge procedures on file.  ED SEPSIS DOCUMENTATION   Time reflects when diagnosis was documented in both MDM as applicable and the Disposition within this note       Time User Action Codes Description Comment    4/13/2025  9:46 PM Eugenio Howard [J45.41] Moderate persistent asthma with acute exacerbation     4/13/2025  9:46 PM Eugenio Howard [J30.9] Allergic rhinitis     4/13/2025  9:46 PM Eugenio Howard [S63.502A] Sprain of left wrist, initial encounter                  Eugenio Howard MD  04/13/25 [id number]